# Patient Record
Sex: FEMALE | Race: OTHER | Employment: FULL TIME | URBAN - METROPOLITAN AREA
[De-identification: names, ages, dates, MRNs, and addresses within clinical notes are randomized per-mention and may not be internally consistent; named-entity substitution may affect disease eponyms.]

---

## 2017-03-07 ENCOUNTER — OFFICE VISIT (OUTPATIENT)
Dept: FAMILY MEDICINE CLINIC | Facility: CLINIC | Age: 42
End: 2017-03-07

## 2017-03-07 VITALS
SYSTOLIC BLOOD PRESSURE: 108 MMHG | OXYGEN SATURATION: 99 % | DIASTOLIC BLOOD PRESSURE: 60 MMHG | HEART RATE: 61 BPM | HEIGHT: 62.5 IN | TEMPERATURE: 98 F

## 2017-03-07 DIAGNOSIS — J01.10 ACUTE FRONTAL SINUSITIS, RECURRENCE NOT SPECIFIED: Primary | ICD-10-CM

## 2017-03-07 PROCEDURE — 99213 OFFICE O/P EST LOW 20 MIN: CPT | Performed by: PHYSICIAN ASSISTANT

## 2017-03-07 RX ORDER — AMOXICILLIN AND CLAVULANATE POTASSIUM 875; 125 MG/1; MG/1
1 TABLET, FILM COATED ORAL 2 TIMES DAILY
Qty: 20 TABLET | Refills: 0 | Status: SHIPPED | OUTPATIENT
Start: 2017-03-07 | End: 2017-03-17

## 2017-03-07 NOTE — PROGRESS NOTES
CHIEF COMPLAINT:   Patient presents with:  Sinusitis: x6 days    HPI:   Zulma Everett is a 35year old female who presents for sinus congestion for  6  days. Symptoms have been worsening since onset.  Sinus congestion/pain is described as a pressure and is Comment Esophagoscopy with endoscopic ultrasound    OTHER  12/2012    Comment ESSURE    INJECTION, ANESTHETIC/STEROID, TRANSFORAMINAL EPIDURAL; LUMBAR/SACRAL, SINGLE LEVEL Left 7/17/2014    Comment Procedure: TRANSFORAMINAL EPIDURAL - LUMBAR;  Surgeon: Nickie Cunningham NOSE: nostrils patent with some thick, yellow nasal mucous noted, nasal mucosa reddened and mildly inflamed  THROAT: oral mucosa pink, moist. No visible dental caries. Posterior pharynx is not erythematous. No exudates.   LUNGS: clear to auscultation bilate · Heat may help soothe painful areas of the face. Use a towel soaked in hot water. Or,  the shower and direct the hot spray onto your face.  Using a vaporizer along with a menthol rub at night may also help.   · An expectorant containing guaifenesin · Vision problems, including blurred or double vision  · Fever of 100.4ºF (38ºC) or higher, or as directed by your healthcare provider  · Seizure  · Breathing problems  · Symptoms not resolving within 10 days  Date Last Reviewed: 4/13/2015  © 5816-1563 The Your doctor may prescribe medications to help treat your sinusitis. If you have an infection, antibiotics can help clear it up. If you are prescribed antibiotics, take all pills on schedule until they are gone, even if you feel better.  Decongestants help r · Sit in the nonsmoking sections of restaurants. · Don't go outdoors during peak pollution hours such as rush hour. · Keep an air conditioner on during allergy season. Clean its filter regularly.   · Ask your healthcare provider about a referral to have a

## 2017-03-07 NOTE — PATIENT INSTRUCTIONS
Sinusitis (Antibiotic Treatment)    The sinuses are air-filled spaces within the bones of the face. They connect to the inside of the nose. Sinusitis is an inflammation of the tissue lining the sinus cavity. Sinus inflammation can occur during a cold.  It · Do not use nasal rinses or irrigation during an acute sinus infection, unless told to by your health care provider. Rinsing may spread the infection to other sinuses.   · Use acetaminophen or ibuprofen to control pain, unless another pain medicine was pre Drinking extra fluids helps thin your mucus. This lets it drain from your sinuses more easily. Have a glass of water every hour or two. A humidifier helps in much the same way. Fluids can also offset the drying effects of certain medicines.  If you use a hu When traveling on an airplane, use saline nasal spray to keep your sinuses moist. Drink plenty of fluids. You may also want to take a decongestant before you get on the plane. Prevent colds  Do what you can to avoid being exposed to colds and flu.  When p © 6375-8041 [address], [address] [place]. All rights reserved. This information is not intended as a substitute for professional medical care. Always follow your healthcare professional's instructions.

## 2017-04-24 PROBLEM — M25.561 PATELLOFEMORAL JOINT PAIN, RIGHT: Status: ACTIVE | Noted: 2017-04-24

## 2017-04-24 PROBLEM — M25.461 EFFUSION OF KNEE JOINT RIGHT: Status: ACTIVE | Noted: 2017-04-24

## 2017-05-25 ENCOUNTER — OFFICE VISIT (OUTPATIENT)
Dept: INTERNAL MEDICINE CLINIC | Facility: CLINIC | Age: 42
End: 2017-05-25

## 2017-05-25 VITALS
HEART RATE: 62 BPM | TEMPERATURE: 98 F | DIASTOLIC BLOOD PRESSURE: 62 MMHG | WEIGHT: 139 LBS | HEIGHT: 63 IN | RESPIRATION RATE: 16 BRPM | SYSTOLIC BLOOD PRESSURE: 106 MMHG | BODY MASS INDEX: 24.63 KG/M2

## 2017-05-25 DIAGNOSIS — Z79.899 MEDICATION MANAGEMENT: ICD-10-CM

## 2017-05-25 DIAGNOSIS — R51.9 HEADACHE, UNSPECIFIED HEADACHE TYPE: Primary | ICD-10-CM

## 2017-05-25 PROCEDURE — 99213 OFFICE O/P EST LOW 20 MIN: CPT | Performed by: PHYSICIAN ASSISTANT

## 2017-05-25 PROCEDURE — 93000 ELECTROCARDIOGRAM COMPLETE: CPT | Performed by: PHYSICIAN ASSISTANT

## 2017-05-25 RX ORDER — SUMATRIPTAN 50 MG/1
TABLET, FILM COATED ORAL
Qty: 8 TABLET | Refills: 0 | Status: SHIPPED | OUTPATIENT
Start: 2017-05-25 | End: 2018-03-06

## 2017-05-25 NOTE — PROGRESS NOTES
Patient presents with:  Headache: Pt c/o Headache x 4 days- Pt states she has \"pounding\" sensation in temples, light sensativity and nausea, Pt denies any vomiting,or blurred vision Pt denies any trauma      HPI:  Pt presents with c/o HA x 4 days.   Pt st Laboratory examination ordered as part of a routine general medical examination     UTI (lower urinary tract infection)     URI (upper respiratory infection)     Sinus congestion     Effusion of knee joint right     Patellofemoral joint pain, right No orders of the defined types were placed in this encounter.        Meds & Refills for this Visit:  Signed Prescriptions Disp Refills    SUMAtriptan Succinate 50 MG Oral Tab 8 tablet 0      Sig: Take 1 PO at onset of HA, may repeat X 1 after 2 hr if HA per Pay attention to what seems to trigger your headache. Try to avoid the triggers when you can. If you have frequent headaches, consider keeping a headache diary. In it, write down what you were doing, feeling, or eating in the hours before each headache.  Sh Follow up with your healthcare provider, or as advised. Talk with your provider if you have frequent headaches. He or she can figure out a treatment plan. Ask if you can have medicine to take at home the next time you get a bad headache.  This may keep you

## 2017-05-25 NOTE — PATIENT INSTRUCTIONS
Migraine Headache  This often severe type of headache is different from other types of headaches in that symptoms other than pain occur with the headache.  Nausea and vomiting, lightheadedness, sensitivity to light (photophobia), and other visual disturba If stress seems to be a trigger for your headaches, figure out what is causing stress in your life. Learn new ways to handle your stress. Ideas include regular exercise, biofeedback, self-hypnosis, yoga, and meditation.  Talk with your healthcare provider t Call your healthcare provider right away if any of these occur:  · Your head pain gets worse, or doesn’t get better within 24 hours  · You can’t keep liquids down (repeated vomiting)  · Pain in your sinuses, ears, or throat  · Fever of 100.4º F (38º C) or

## 2017-08-04 ENCOUNTER — OFFICE VISIT (OUTPATIENT)
Dept: FAMILY MEDICINE CLINIC | Facility: CLINIC | Age: 42
End: 2017-08-04

## 2017-08-04 VITALS
OXYGEN SATURATION: 98 % | HEIGHT: 63 IN | HEART RATE: 65 BPM | TEMPERATURE: 99 F | SYSTOLIC BLOOD PRESSURE: 118 MMHG | DIASTOLIC BLOOD PRESSURE: 62 MMHG

## 2017-08-04 DIAGNOSIS — J01.01 ACUTE RECURRENT MAXILLARY SINUSITIS: Primary | ICD-10-CM

## 2017-08-04 PROCEDURE — 99213 OFFICE O/P EST LOW 20 MIN: CPT | Performed by: PHYSICIAN ASSISTANT

## 2017-08-04 RX ORDER — AMOXICILLIN AND CLAVULANATE POTASSIUM 875; 125 MG/1; MG/1
1 TABLET, FILM COATED ORAL 2 TIMES DAILY
Qty: 20 TABLET | Refills: 0 | Status: SHIPPED | OUTPATIENT
Start: 2017-08-04 | End: 2017-08-14

## 2017-08-04 NOTE — PATIENT INSTRUCTIONS
Self-Care for Sinusitis     Drinking plenty of water can help sinuses drain. Sinusitis can often be managed with self-care. Self-care can keep sinuses moist and make you feel more comfortable. Remember to follow your doctor's instructions closely.  This Colds, flu, and allergies make it more likely for you to get sinusitis. Do your best to prevent sinusitis by preventing these problems. Do what you can to avoid getting colds and other infections. Stay away from things that cause allergies (allergens).  Katelyn Spar · Stay away from all types of smoke, which dries out sinus linings. This includes tobacco smoke and chemical smoke in workplace settings. · Use saltwater rinses.   Date Last Reviewed: 10/1/2016  © 7461-2612 The [address] · Over-the-counter decongestants may be used unless a similar medicine was prescribed. Nasal sprays work the fastest. Use one that contains phenylephrine or oxymetazoline. First blow the nose gently. Then use the spray.  Do not use these medicines more ofte © 8747-2022 [address], [address] [place]. All rights reserved. This information is not intended as a substitute for professional medical care. Always follow your healthcare professional's instructions.

## 2017-08-04 NOTE — PROGRESS NOTES
CHIEF COMPLAINT:   Patient presents with:  Sinusitis    HPI:   Addis Miller is a 38year old female who presents for sinus congestion for  5  days. Symptoms have been worsening since onset.  Sinus congestion/pain is described as a pressure and is located No date: CHOLECYSTECTOMY      Comment: June 2006 5/14/2010: COLONOSCOPY      Comment: Complete, d/t hematochezia  1992: D & C      Comment: also in 1994, 1996, 1997 7/17/2014: INJECTION, ANESTHETIC/STEROID, TRANSFORAMINAL * Left      Comment: Procedure: /62 (BP Location: Right arm, Patient Position: Sitting, Cuff Size: adult)   Pulse 65   Temp 98.6 °F (37 °C) (Oral)   Ht 63\"   LMP 08/01/2017 (Exact Date)   SpO2 98%   Breastfeeding?  No   GENERAL: well developed, well nourished, and in no apparent di Risks, benefits, side effects of medication addressed and explained. Patient Instructions       Self-Care for Sinusitis     Drinking plenty of water can help sinuses drain. Sinusitis can often be managed with self-care.  Self-care can keep sinuses mois © 1297-2017 The [address] [place] [address], [address] [place]. All rights reserved. This information is not intended as a substitute for professional medical care. Always follow your healthcare professional's instructions.         Prevent Keeping your sinuses moist makes your mucus thinner. This allows your sinuses to drain better. And this helps prevent infection. Ask your doctor about these suggestions:  · Use a humidifier. Clean it often to remove any mold or mildew.   · Drink several gla · Over-the-counter decongestants may be used unless a similar medicine was prescribed. Nasal sprays work the fastest. Use one that contains phenylephrine or oxymetazoline. First blow the nose gently. Then use the spray.  Do not use these medicines more ofte © 4246-8298 The [address] [place] [address], [address] [place]. All rights reserved. This information is not intended as a substitute for professional medical care. Always follow your healthcare professional's instructions.             The

## 2017-12-18 ENCOUNTER — OFFICE VISIT (OUTPATIENT)
Dept: INTERNAL MEDICINE CLINIC | Facility: CLINIC | Age: 42
End: 2017-12-18

## 2017-12-18 VITALS
SYSTOLIC BLOOD PRESSURE: 100 MMHG | BODY MASS INDEX: 26.02 KG/M2 | DIASTOLIC BLOOD PRESSURE: 60 MMHG | WEIGHT: 145 LBS | HEIGHT: 62.5 IN | TEMPERATURE: 98 F | HEART RATE: 77 BPM | RESPIRATION RATE: 16 BRPM | OXYGEN SATURATION: 99 %

## 2017-12-18 DIAGNOSIS — Z13.29 SCREENING FOR THYROID DISORDER: ICD-10-CM

## 2017-12-18 DIAGNOSIS — Z13.228 SCREENING FOR ENDOCRINE, NUTRITIONAL, METABOLIC AND IMMUNITY DISORDER: ICD-10-CM

## 2017-12-18 DIAGNOSIS — Z13.0 SCREENING FOR ENDOCRINE, NUTRITIONAL, METABOLIC AND IMMUNITY DISORDER: ICD-10-CM

## 2017-12-18 DIAGNOSIS — Z13.0 SCREENING FOR DISORDER OF BLOOD AND BLOOD-FORMING ORGANS: ICD-10-CM

## 2017-12-18 DIAGNOSIS — Z13.29 SCREENING FOR ENDOCRINE, NUTRITIONAL, METABOLIC AND IMMUNITY DISORDER: ICD-10-CM

## 2017-12-18 DIAGNOSIS — Z13.21 SCREENING FOR ENDOCRINE, NUTRITIONAL, METABOLIC AND IMMUNITY DISORDER: ICD-10-CM

## 2017-12-18 DIAGNOSIS — M25.511 ACUTE PAIN OF RIGHT SHOULDER: ICD-10-CM

## 2017-12-18 DIAGNOSIS — Z00.00 ROUTINE GENERAL MEDICAL EXAMINATION AT A HEALTH CARE FACILITY: Primary | ICD-10-CM

## 2017-12-18 DIAGNOSIS — Z13.220 SCREENING FOR LIPID DISORDERS: ICD-10-CM

## 2017-12-18 PROCEDURE — 99396 PREV VISIT EST AGE 40-64: CPT | Performed by: INTERNAL MEDICINE

## 2017-12-18 NOTE — PROGRESS NOTES
Patient presents with:  Physical: no pap      HPI:    Patient here for cpe  Sees Dr. Juan Mccormack, believes she is utd on pap and mammogram, will have information faxed to us  Just started her period today,stays regular with OCP  No complaints except right should positive 12/2012   • Cholelithiasis    • Chronic sinusitis    • Eczema    • General counseling for initiation of other contraceptive measures    • Herniated lumbar disc without myelopathy     L4-L5   • History of molar pregnancy, antepartum     x 2   • Int Current Outpatient Prescriptions:  SUMAtriptan Succinate 50 MG Oral Tab Take 1 PO at onset of HA, may repeat X 1 after 2 hr if HA persists. Disp: 8 tablet Rfl: 0   NORTREL 7/7/7 0.5/0.75/1-35 MG-MCG Oral Tab TAKE 1 TABLET BY MOUTH DAILY.  Disp: 3 Pa sensory deficit. Normal muscle tone. Coordination normal.   Skin: Skin is warm and dry. No rash noted. No erythema. No pallor. Psychiatric: Normal mood and affect.      A/P:    Routine general medical examination at a health care facility - sees gyne for

## 2017-12-22 ENCOUNTER — LAB ENCOUNTER (OUTPATIENT)
Dept: LAB | Age: 42
End: 2017-12-22
Attending: INTERNAL MEDICINE
Payer: COMMERCIAL

## 2017-12-22 DIAGNOSIS — Z13.228 SCREENING FOR ENDOCRINE, NUTRITIONAL, METABOLIC AND IMMUNITY DISORDER: ICD-10-CM

## 2017-12-22 DIAGNOSIS — Z13.0 SCREENING FOR DISORDER OF BLOOD AND BLOOD-FORMING ORGANS: ICD-10-CM

## 2017-12-22 DIAGNOSIS — Z13.29 SCREENING FOR ENDOCRINE, NUTRITIONAL, METABOLIC AND IMMUNITY DISORDER: ICD-10-CM

## 2017-12-22 DIAGNOSIS — Z13.0 SCREENING FOR ENDOCRINE, NUTRITIONAL, METABOLIC AND IMMUNITY DISORDER: ICD-10-CM

## 2017-12-22 DIAGNOSIS — Z13.220 SCREENING FOR LIPID DISORDERS: ICD-10-CM

## 2017-12-22 DIAGNOSIS — Z13.29 SCREENING FOR THYROID DISORDER: ICD-10-CM

## 2017-12-22 DIAGNOSIS — Z13.21 SCREENING FOR ENDOCRINE, NUTRITIONAL, METABOLIC AND IMMUNITY DISORDER: ICD-10-CM

## 2017-12-22 PROCEDURE — 80061 LIPID PANEL: CPT | Performed by: INTERNAL MEDICINE

## 2017-12-22 PROCEDURE — 80050 GENERAL HEALTH PANEL: CPT | Performed by: INTERNAL MEDICINE

## 2018-01-12 ENCOUNTER — OFFICE VISIT (OUTPATIENT)
Dept: INTERNAL MEDICINE CLINIC | Facility: CLINIC | Age: 43
End: 2018-01-12

## 2018-01-12 VITALS
HEIGHT: 63 IN | TEMPERATURE: 99 F | WEIGHT: 139 LBS | HEART RATE: 92 BPM | DIASTOLIC BLOOD PRESSURE: 60 MMHG | SYSTOLIC BLOOD PRESSURE: 92 MMHG | BODY MASS INDEX: 24.63 KG/M2

## 2018-01-12 DIAGNOSIS — R11.2 NON-INTRACTABLE VOMITING WITH NAUSEA, UNSPECIFIED VOMITING TYPE: ICD-10-CM

## 2018-01-12 DIAGNOSIS — R19.7 DIARRHEA, UNSPECIFIED TYPE: Primary | ICD-10-CM

## 2018-01-12 PROCEDURE — 99213 OFFICE O/P EST LOW 20 MIN: CPT | Performed by: NURSE PRACTITIONER

## 2018-01-12 RX ORDER — ONDANSETRON 4 MG/1
4 TABLET, FILM COATED ORAL EVERY 12 HOURS PRN
Qty: 20 TABLET | Refills: 0 | Status: SHIPPED | OUTPATIENT
Start: 2018-01-12 | End: 2018-03-06

## 2018-01-12 NOTE — PROGRESS NOTES
Cliff Bueno is a 38year old female. Patient presents with:  Diarrhea: and terrible vomiting since 1 am.      HPI:   Presents for eval of abd pain. Last night with sudden onset of foul odor flatus.   Later dizziness and lightheaded  Concerned for verti mention of complication     Post Varicella      Social History:  Smoking status: Never Smoker                                                              Smokeless tobacco: Never Used                      Alcohol use:  No              Family History   Prob Consults:  None    No Follow-up on file. There are no Patient Instructions on file for this visit.

## 2018-03-06 ENCOUNTER — TELEPHONE (OUTPATIENT)
Dept: INTERNAL MEDICINE CLINIC | Facility: CLINIC | Age: 43
End: 2018-03-06

## 2018-03-06 PROBLEM — M25.561 ACUTE PAIN OF RIGHT KNEE: Status: ACTIVE | Noted: 2018-03-06

## 2018-03-06 NOTE — TELEPHONE ENCOUNTER
Patient dropped off [employer] Self-Reporting Form to be completed by Dr. Carol Ann Cristina. Once complete, fax to:    [facility]  Fax: [phone]    Questions: [phone] original in incoming faxes folder at the .   Isabella Ghotra

## 2018-03-12 NOTE — TELEPHONE ENCOUNTER
Forms have been signed, faxed and sent to scan. Confirmation was received.  Original along with confirmation was mailed to patient

## 2018-03-19 PROBLEM — M23.91 INTERNAL DERANGEMENT OF KNEE, RIGHT: Status: ACTIVE | Noted: 2018-03-19

## 2018-04-13 ENCOUNTER — OFFICE VISIT (OUTPATIENT)
Dept: INTERNAL MEDICINE CLINIC | Facility: CLINIC | Age: 43
End: 2018-04-13

## 2018-04-13 ENCOUNTER — MED REC SCAN ONLY (OUTPATIENT)
Dept: INTERNAL MEDICINE CLINIC | Facility: CLINIC | Age: 43
End: 2018-04-13

## 2018-04-13 ENCOUNTER — LAB ENCOUNTER (OUTPATIENT)
Dept: LAB | Age: 43
End: 2018-04-13
Attending: NURSE PRACTITIONER
Payer: COMMERCIAL

## 2018-04-13 VITALS
TEMPERATURE: 98 F | WEIGHT: 135 LBS | HEART RATE: 64 BPM | HEIGHT: 63 IN | BODY MASS INDEX: 23.92 KG/M2 | RESPIRATION RATE: 16 BRPM | DIASTOLIC BLOOD PRESSURE: 60 MMHG | SYSTOLIC BLOOD PRESSURE: 102 MMHG

## 2018-04-13 DIAGNOSIS — Z20.2 POSSIBLE EXPOSURE TO STD: Primary | ICD-10-CM

## 2018-04-13 DIAGNOSIS — Z20.2 POSSIBLE EXPOSURE TO STD: ICD-10-CM

## 2018-04-13 PROBLEM — M25.561 ACUTE PAIN OF RIGHT KNEE: Status: RESOLVED | Noted: 2018-03-06 | Resolved: 2018-04-13

## 2018-04-13 PROCEDURE — 87591 N.GONORRHOEAE DNA AMP PROB: CPT | Performed by: NURSE PRACTITIONER

## 2018-04-13 PROCEDURE — 87340 HEPATITIS B SURFACE AG IA: CPT | Performed by: NURSE PRACTITIONER

## 2018-04-13 PROCEDURE — 86706 HEP B SURFACE ANTIBODY: CPT | Performed by: NURSE PRACTITIONER

## 2018-04-13 PROCEDURE — 87510 GARDNER VAG DNA DIR PROBE: CPT | Performed by: NURSE PRACTITIONER

## 2018-04-13 PROCEDURE — 87389 HIV-1 AG W/HIV-1&-2 AB AG IA: CPT | Performed by: NURSE PRACTITIONER

## 2018-04-13 PROCEDURE — 87660 TRICHOMONAS VAGIN DIR PROBE: CPT | Performed by: NURSE PRACTITIONER

## 2018-04-13 PROCEDURE — 87491 CHLMYD TRACH DNA AMP PROBE: CPT | Performed by: NURSE PRACTITIONER

## 2018-04-13 PROCEDURE — 99213 OFFICE O/P EST LOW 20 MIN: CPT | Performed by: NURSE PRACTITIONER

## 2018-04-13 PROCEDURE — 86803 HEPATITIS C AB TEST: CPT | Performed by: NURSE PRACTITIONER

## 2018-04-13 PROCEDURE — 87480 CANDIDA DNA DIR PROBE: CPT | Performed by: NURSE PRACTITIONER

## 2018-04-13 RX ORDER — METRONIDAZOLE 500 MG/1
500 TABLET ORAL 2 TIMES DAILY
Qty: 14 TABLET | Refills: 0 | Status: SHIPPED | OUTPATIENT
Start: 2018-04-13 | End: 2018-09-12 | Stop reason: ALTCHOICE

## 2018-04-13 NOTE — PROGRESS NOTES
Abran Contreras is a 38year old female. Patient presents with:  STD: Room 10 AH - Patient requesting testing due to possible recent exposure       HPI:   Presents for STD testing. She recently became aware of her  having an affair.    She states s Breast Cancer Paternal Aunt 52   • Asthma Daughter         Allergies  No Known Allergies    REVIEW OF SYSTEMS:   GENERAL HEALTH: feels well otherwise  GI: denies abdominal pain and denies heartburn, no diarrhea or constipation  MUSCULOSKELETAL:  No arthral

## 2018-04-16 DIAGNOSIS — Z23 NEED FOR HEPATITIS B VACCINATION: Primary | ICD-10-CM

## 2018-04-17 ENCOUNTER — NURSE ONLY (OUTPATIENT)
Dept: INTERNAL MEDICINE CLINIC | Facility: CLINIC | Age: 43
End: 2018-04-17

## 2018-04-17 PROCEDURE — 90471 IMMUNIZATION ADMIN: CPT | Performed by: INTERNAL MEDICINE

## 2018-04-17 PROCEDURE — 90746 HEPB VACCINE 3 DOSE ADULT IM: CPT | Performed by: INTERNAL MEDICINE

## 2018-04-17 NOTE — PROGRESS NOTES
Patient here for Hepatitis B vaccine #1, given in the left deltoid without difficulty. Patient tolerated well.

## 2018-09-12 ENCOUNTER — OFFICE VISIT (OUTPATIENT)
Dept: FAMILY MEDICINE CLINIC | Facility: CLINIC | Age: 43
End: 2018-09-12
Payer: COMMERCIAL

## 2018-09-12 VITALS
SYSTOLIC BLOOD PRESSURE: 108 MMHG | RESPIRATION RATE: 16 BRPM | TEMPERATURE: 98 F | DIASTOLIC BLOOD PRESSURE: 60 MMHG | HEART RATE: 65 BPM | OXYGEN SATURATION: 99 %

## 2018-09-12 DIAGNOSIS — J06.9 UPPER RESPIRATORY TRACT INFECTION, UNSPECIFIED TYPE: Primary | ICD-10-CM

## 2018-09-12 PROCEDURE — 99213 OFFICE O/P EST LOW 20 MIN: CPT | Performed by: PHYSICIAN ASSISTANT

## 2018-09-12 RX ORDER — BENZONATATE 200 MG/1
200 CAPSULE ORAL 3 TIMES DAILY PRN
Qty: 30 CAPSULE | Refills: 0 | Status: SHIPPED | OUTPATIENT
Start: 2018-09-12 | End: 2019-04-02 | Stop reason: ALTCHOICE

## 2018-09-12 RX ORDER — FLUTICASONE PROPIONATE 50 MCG
2 SPRAY, SUSPENSION (ML) NASAL DAILY
Qty: 1 INHALER | Refills: 0 | Status: SHIPPED | OUTPATIENT
Start: 2018-09-12 | End: 2019-04-02 | Stop reason: ALTCHOICE

## 2018-09-12 RX ORDER — AMOXICILLIN AND CLAVULANATE POTASSIUM 875; 125 MG/1; MG/1
1 TABLET, FILM COATED ORAL 2 TIMES DAILY
Qty: 20 TABLET | Refills: 0 | Status: SHIPPED | OUTPATIENT
Start: 2018-09-12 | End: 2018-09-22

## 2018-09-13 NOTE — PROGRESS NOTES
CHIEF COMPLAINT:   Patient presents with:  URI: 8 days. congestion, nasal drip, cough, feverish feeling, achy, sore throat. HPI:      Petey Bipin is a 37year old female who presents for upper respiratory symptoms for 8 days.  Patient reports sore Esophagoscopy with endoscopic ultrasound  1992: PUNC/ASPIR BREAST CYST      Comment:  left breast cyst      Social History    Tobacco Use      Smoking status: Never Smoker      Smokeless tobacco: Never Used    Alcohol use: No      Alcohol/week: 0.0 oz    D (200 mg total) by mouth 3 (three) times daily as needed for cough. • Amoxicillin-Pot Clavulanate 875-125 MG Oral Tab 20 tablet 0     Sig: Take 1 tablet by mouth 2 (two) times daily for 10 days. PLAN: Meds as above. Recommend rest and fluids.   Ty

## 2019-04-02 ENCOUNTER — OFFICE VISIT (OUTPATIENT)
Dept: FAMILY MEDICINE CLINIC | Facility: CLINIC | Age: 44
End: 2019-04-02
Payer: COMMERCIAL

## 2019-04-02 VITALS
DIASTOLIC BLOOD PRESSURE: 60 MMHG | TEMPERATURE: 98 F | HEART RATE: 98 BPM | OXYGEN SATURATION: 99 % | SYSTOLIC BLOOD PRESSURE: 96 MMHG | RESPIRATION RATE: 16 BRPM

## 2019-04-02 DIAGNOSIS — J01.00 ACUTE NON-RECURRENT MAXILLARY SINUSITIS: Primary | ICD-10-CM

## 2019-04-02 PROCEDURE — 99213 OFFICE O/P EST LOW 20 MIN: CPT | Performed by: PHYSICIAN ASSISTANT

## 2019-04-02 RX ORDER — AMOXICILLIN AND CLAVULANATE POTASSIUM 875; 125 MG/1; MG/1
1 TABLET, FILM COATED ORAL 2 TIMES DAILY
Qty: 20 TABLET | Refills: 0 | Status: SHIPPED | OUTPATIENT
Start: 2019-04-02 | End: 2019-04-12

## 2019-04-02 RX ORDER — ALBUTEROL SULFATE 90 UG/1
2 AEROSOL, METERED RESPIRATORY (INHALATION) EVERY 4 HOURS PRN
Qty: 1 INHALER | Refills: 0 | Status: SHIPPED | OUTPATIENT
Start: 2019-04-02 | End: 2019-05-10

## 2019-04-02 RX ORDER — BENZONATATE 200 MG/1
200 CAPSULE ORAL 3 TIMES DAILY PRN
Qty: 30 CAPSULE | Refills: 0 | Status: SHIPPED | OUTPATIENT
Start: 2019-04-02 | End: 2019-05-10

## 2019-04-02 RX ORDER — FLUTICASONE PROPIONATE 50 MCG
2 SPRAY, SUSPENSION (ML) NASAL DAILY
Qty: 1 INHALER | Refills: 0 | Status: SHIPPED | OUTPATIENT
Start: 2019-04-02 | End: 2019-05-10

## 2019-04-02 NOTE — PROGRESS NOTES
CHIEF COMPLAINT:   Patient presents with:  Sinus Problem      HPI:   Mg Setting is a 37year old female who presents for cold symptoms for  10  days. Symptoms have progressed into sinus congestion and been worsening since onset.  Sinus congestion/pain i • Internal derangement of knee, right 3/19/2018   • Internal hemorrhoids    • Varicella without mention of complication     Post Varicella      Past Surgical History:   Procedure Laterality Date   • BACK SURGERY  L4/5    December 2007: [facility], pharynx is mildy erythematous, small PND,  no exudates  Tonsils enlarged +2-3  NECK: supple, non-tender  LUNGS: clear to auscultation bilaterally, no wheezes or rhonchi. Breathing is non labored.   CARDIO: S1S2 RRR   EXTREMITIES: no cyanosis, clubbing or ed

## 2019-05-10 PROCEDURE — 87624 HPV HI-RISK TYP POOLED RSLT: CPT | Performed by: OBSTETRICS & GYNECOLOGY

## 2019-05-10 PROCEDURE — 88175 CYTOPATH C/V AUTO FLUID REDO: CPT | Performed by: OBSTETRICS & GYNECOLOGY

## 2019-07-13 ENCOUNTER — HOSPITAL ENCOUNTER (OUTPATIENT)
Age: 44
Discharge: HOME | End: 2019-07-13
Attending: FAMILY MEDICINE
Payer: COMMERCIAL

## 2019-07-13 VITALS
HEIGHT: 63 IN | OXYGEN SATURATION: 99 % | DIASTOLIC BLOOD PRESSURE: 71 MMHG | BODY MASS INDEX: 23.04 KG/M2 | TEMPERATURE: 98 F | HEART RATE: 61 BPM | RESPIRATION RATE: 16 BRPM | WEIGHT: 130 LBS | SYSTOLIC BLOOD PRESSURE: 94 MMHG

## 2019-07-13 DIAGNOSIS — G43.909 MIGRAINE WITHOUT STATUS MIGRAINOSUS, NOT INTRACTABLE, UNSPECIFIED MIGRAINE TYPE: Primary | ICD-10-CM

## 2019-07-13 DIAGNOSIS — H65.91 RIGHT NON-SUPPURATIVE OTITIS MEDIA: ICD-10-CM

## 2019-07-13 DIAGNOSIS — H61.23 BILATERAL IMPACTED CERUMEN: ICD-10-CM

## 2019-07-13 PROCEDURE — 99204 OFFICE O/P NEW MOD 45 MIN: CPT

## 2019-07-13 PROCEDURE — 99214 OFFICE O/P EST MOD 30 MIN: CPT

## 2019-07-13 PROCEDURE — 96374 THER/PROPH/DIAG INJ IV PUSH: CPT

## 2019-07-13 PROCEDURE — 96375 TX/PRO/DX INJ NEW DRUG ADDON: CPT

## 2019-07-13 PROCEDURE — 69210 REMOVE IMPACTED EAR WAX UNI: CPT

## 2019-07-13 RX ORDER — SUMATRIPTAN 50 MG/1
50 TABLET, FILM COATED ORAL EVERY 2 HOUR PRN
Qty: 6 TABLET | Refills: 0 | Status: SHIPPED | OUTPATIENT
Start: 2019-07-13 | End: 2019-12-30

## 2019-07-13 RX ORDER — AMOXICILLIN 875 MG/1
875 TABLET, COATED ORAL EVERY 12 HOURS
Qty: 20 TABLET | Refills: 0 | Status: SHIPPED | OUTPATIENT
Start: 2019-07-13 | End: 2019-07-23

## 2019-07-13 RX ORDER — KETOROLAC TROMETHAMINE 30 MG/ML
30 INJECTION, SOLUTION INTRAMUSCULAR; INTRAVENOUS ONCE
Status: COMPLETED | OUTPATIENT
Start: 2019-07-13 | End: 2019-07-13

## 2019-07-13 RX ORDER — DIPHENHYDRAMINE HYDROCHLORIDE 50 MG/ML
25 INJECTION INTRAMUSCULAR; INTRAVENOUS ONCE
Status: COMPLETED | OUTPATIENT
Start: 2019-07-13 | End: 2019-07-13

## 2019-07-13 RX ORDER — METOCLOPRAMIDE HYDROCHLORIDE 5 MG/ML
10 INJECTION INTRAMUSCULAR; INTRAVENOUS ONCE
Status: COMPLETED | OUTPATIENT
Start: 2019-07-13 | End: 2019-07-13

## 2019-07-13 NOTE — ED INITIAL ASSESSMENT (HPI)
Hx of migraines, says typical pain x 4 days, temporal throbbing & pain to nape of neck but also having ear pain & sinus pressure. Recent travel by plane. No relief w OTC Excedrine migraine. Waves of nausea.

## 2019-07-14 NOTE — ED PROVIDER NOTES
Patient Seen in: [facility]    History   Patient presents with:  Headache (neurologic)    Stated Complaint: migrane x4 days     HPI    53-year-old female with history of migraine presents for headache for the past 4 days. Never Used    Alcohol use: No      Alcohol/week: 0.0 oz    Drug use: No      Review of Systems    Positive for stated complaint: migrane x4 days   Other systems are as noted in HPI. Constitutional and vital signs reviewed.       All other systems reviewed headache. All with the PCP if not better.             Disposition and Plan     Clinical Impression:  Migraine without status migrainosus, not intractable, unspecified migraine type  (primary encounter diagnosis)  Right non-suppurative otitis media  Bilater

## 2019-08-19 ENCOUNTER — OFFICE VISIT (OUTPATIENT)
Dept: INTERNAL MEDICINE CLINIC | Facility: CLINIC | Age: 44
End: 2019-08-19
Payer: COMMERCIAL

## 2019-08-19 VITALS
DIASTOLIC BLOOD PRESSURE: 50 MMHG | SYSTOLIC BLOOD PRESSURE: 94 MMHG | BODY MASS INDEX: 24.8 KG/M2 | WEIGHT: 140 LBS | HEIGHT: 63 IN | RESPIRATION RATE: 16 BRPM | TEMPERATURE: 99 F | HEART RATE: 84 BPM

## 2019-08-19 DIAGNOSIS — Z82.49 FAMILY HISTORY OF ANEURYSM: ICD-10-CM

## 2019-08-19 DIAGNOSIS — R51.9 WORSENING HEADACHES: Primary | ICD-10-CM

## 2019-08-19 PROCEDURE — 99214 OFFICE O/P EST MOD 30 MIN: CPT | Performed by: NURSE PRACTITIONER

## 2019-08-19 RX ORDER — ONDANSETRON 4 MG/1
4 TABLET, FILM COATED ORAL EVERY 12 HOURS PRN
Qty: 20 TABLET | Refills: 0 | Status: SHIPPED | OUTPATIENT
Start: 2019-08-19 | End: 2019-11-22 | Stop reason: ALTCHOICE

## 2019-08-19 RX ORDER — FLUTICASONE PROPIONATE 50 MCG
1 SPRAY, SUSPENSION (ML) NASAL 2 TIMES DAILY
Qty: 1 BOTTLE | Refills: 3 | Status: SHIPPED | OUTPATIENT
Start: 2019-08-19

## 2019-08-19 RX ORDER — NAPROXEN 500 MG/1
500 TABLET ORAL 2 TIMES DAILY WITH MEALS
Qty: 60 TABLET | Refills: 0 | Status: SHIPPED | OUTPATIENT
Start: 2019-08-19 | End: 2019-09-13

## 2019-08-19 NOTE — PROGRESS NOTES
Norbert Fernandes is a 53year old female. Patient presents with:  Migraine: x2 months       HPI:   Presents for eval of persistent headaches. Seen at [facility] 7/13/19 for 4 day history headache. Typical of her migraine.     She has had migraines in the past but History of molar pregnancy, antepartum     x 2   • Internal derangement of knee, right 3/19/2018   • Internal hemorrhoids    • Varicella without mention of complication     Post Varicella      Social History:  Social History    Tobacco Use      Smoking sta • naproxen 500 MG Oral Tab 60 tablet 0     Sig: Take 1 tablet (500 mg total) by mouth 2 (two) times daily with meals. • Fluticasone Propionate 50 MCG/ACT Nasal Suspension 1 Bottle 3     Si spray by Nasal route 2 (two) times daily.    • Ondansetron H

## 2019-08-25 ENCOUNTER — HOSPITAL ENCOUNTER (OUTPATIENT)
Dept: MRI IMAGING | Age: 44
Discharge: HOME | End: 2019-08-25
Attending: NURSE PRACTITIONER
Payer: COMMERCIAL

## 2019-08-25 DIAGNOSIS — Z82.49 FAMILY HISTORY OF ANEURYSM: ICD-10-CM

## 2019-08-25 DIAGNOSIS — R51.9 WORSENING HEADACHES: ICD-10-CM

## 2019-08-25 PROCEDURE — A9575 INJ GADOTERATE MEGLUMI 0.1ML: HCPCS | Performed by: NURSE PRACTITIONER

## 2019-08-25 PROCEDURE — 70553 MRI BRAIN STEM W/O & W/DYE: CPT | Performed by: NURSE PRACTITIONER

## 2019-09-13 RX ORDER — NAPROXEN 500 MG/1
TABLET ORAL
Qty: 60 TABLET | Refills: 0 | Status: SHIPPED | OUTPATIENT
Start: 2019-09-13 | End: 2019-11-22 | Stop reason: ALTCHOICE

## 2019-11-22 ENCOUNTER — OFFICE VISIT (OUTPATIENT)
Dept: FAMILY MEDICINE CLINIC | Facility: CLINIC | Age: 44
End: 2019-11-22
Payer: COMMERCIAL

## 2019-11-22 VITALS
BODY MASS INDEX: 24 KG/M2 | RESPIRATION RATE: 16 BRPM | WEIGHT: 138 LBS | HEART RATE: 74 BPM | SYSTOLIC BLOOD PRESSURE: 100 MMHG | DIASTOLIC BLOOD PRESSURE: 60 MMHG | OXYGEN SATURATION: 99 % | TEMPERATURE: 98 F

## 2019-11-22 DIAGNOSIS — J06.9 UPPER RESPIRATORY TRACT INFECTION, UNSPECIFIED TYPE: Primary | ICD-10-CM

## 2019-11-22 PROCEDURE — 99213 OFFICE O/P EST LOW 20 MIN: CPT | Performed by: PHYSICIAN ASSISTANT

## 2019-11-22 RX ORDER — AMOXICILLIN AND CLAVULANATE POTASSIUM 875; 125 MG/1; MG/1
1 TABLET, FILM COATED ORAL 2 TIMES DAILY
Qty: 20 TABLET | Refills: 0 | Status: SHIPPED | OUTPATIENT
Start: 2019-11-22 | End: 2019-12-02

## 2019-11-22 RX ORDER — BENZONATATE 200 MG/1
200 CAPSULE ORAL 3 TIMES DAILY PRN
Qty: 30 CAPSULE | Refills: 0 | Status: SHIPPED | OUTPATIENT
Start: 2019-11-22 | End: 2020-02-15

## 2019-12-30 ENCOUNTER — TELEPHONE (OUTPATIENT)
Dept: INTERNAL MEDICINE CLINIC | Facility: CLINIC | Age: 44
End: 2019-12-30

## 2019-12-30 DIAGNOSIS — Z00.00 ROUTINE GENERAL MEDICAL EXAMINATION AT A HEALTH CARE FACILITY: Primary | ICD-10-CM

## 2019-12-30 DIAGNOSIS — Z13.220 SCREENING FOR LIPID DISORDERS: ICD-10-CM

## 2019-12-30 DIAGNOSIS — Z13.0 SCREENING FOR BLOOD DISEASE: ICD-10-CM

## 2019-12-30 DIAGNOSIS — Z13.228 SCREENING FOR METABOLIC DISORDER: ICD-10-CM

## 2019-12-30 DIAGNOSIS — Z13.29 SCREENING FOR THYROID DISORDER: ICD-10-CM

## 2019-12-30 RX ORDER — SUMATRIPTAN 50 MG/1
50 TABLET, FILM COATED ORAL EVERY 2 HOUR PRN
Qty: 6 TABLET | Refills: 0 | Status: SHIPPED | OUTPATIENT
Start: 2019-12-30 | End: 2020-01-29

## 2019-12-30 NOTE — TELEPHONE ENCOUNTER
Pt called stating she got a rx for SUMAtriptan Succinate 50 MG Oral Tab when she went to  in July and needs new rx sent in -send to local [facility]

## 2019-12-30 NOTE — TELEPHONE ENCOUNTER
Last VISIT 08/19/19  Last REFILL 07/13/19 qty 6 w/0 refills  Last LABS No labs in past year    No future appointments. Please Approve or Deny.

## 2020-01-02 NOTE — TELEPHONE ENCOUNTER
Future Appointments   Date Time Provider Jayshree Caitlin   2/15/2020 11:40 AM Lc Canales MD [facility]     Orders to THE [facility] aware must fast no call back required

## 2020-02-08 ENCOUNTER — LAB ENCOUNTER (OUTPATIENT)
Dept: LAB | Age: 45
End: 2020-02-08
Attending: NURSE PRACTITIONER
Payer: COMMERCIAL

## 2020-02-08 DIAGNOSIS — Z00.00 ROUTINE GENERAL MEDICAL EXAMINATION AT A HEALTH CARE FACILITY: ICD-10-CM

## 2020-02-08 DIAGNOSIS — Z13.0 SCREENING FOR BLOOD DISEASE: ICD-10-CM

## 2020-02-08 DIAGNOSIS — Z13.29 SCREENING FOR THYROID DISORDER: ICD-10-CM

## 2020-02-08 DIAGNOSIS — Z13.228 SCREENING FOR METABOLIC DISORDER: ICD-10-CM

## 2020-02-08 DIAGNOSIS — Z13.220 SCREENING FOR LIPID DISORDERS: ICD-10-CM

## 2020-02-08 LAB
ALBUMIN SERPL-MCNC: 3.8 G/DL (ref 3.4–5)
ALBUMIN/GLOB SERPL: 0.8 {RATIO} (ref 1–2)
ALP LIVER SERPL-CCNC: 61 U/L (ref 39–100)
ALT SERPL-CCNC: 21 U/L (ref 13–56)
ANION GAP SERPL CALC-SCNC: 3 MMOL/L (ref 0–18)
AST SERPL-CCNC: 16 U/L (ref 15–37)
BASOPHILS # BLD AUTO: 0.06 X10(3) UL (ref 0–0.2)
BASOPHILS NFR BLD AUTO: 0.7 %
BILIRUB SERPL-MCNC: 0.4 MG/DL (ref 0.1–2)
BUN BLD-MCNC: 11 MG/DL (ref 7–18)
BUN/CREAT SERPL: 12.2 (ref 10–20)
CALCIUM BLD-MCNC: 9.8 MG/DL (ref 8.5–10.1)
CHLORIDE SERPL-SCNC: 107 MMOL/L (ref 98–112)
CHOLEST SMN-MCNC: 203 MG/DL (ref ?–200)
CO2 SERPL-SCNC: 28 MMOL/L (ref 21–32)
CREAT BLD-MCNC: 0.9 MG/DL (ref 0.55–1.02)
DEPRECATED RDW RBC AUTO: 46.5 FL (ref 35.1–46.3)
EOSINOPHIL # BLD AUTO: 0.13 X10(3) UL (ref 0–0.7)
EOSINOPHIL NFR BLD AUTO: 1.5 %
ERYTHROCYTE [DISTWIDTH] IN BLOOD BY AUTOMATED COUNT: 13.5 % (ref 11–15)
GLOBULIN PLAS-MCNC: 4.6 G/DL (ref 2.8–4.4)
GLUCOSE BLD-MCNC: 93 MG/DL (ref 70–99)
HCT VFR BLD AUTO: 43.9 % (ref 35–48)
HDLC SERPL-MCNC: 64 MG/DL (ref 40–59)
HGB BLD-MCNC: 13.7 G/DL (ref 12–16)
IMM GRANULOCYTES # BLD AUTO: 0.03 X10(3) UL (ref 0–1)
IMM GRANULOCYTES NFR BLD: 0.4 %
LDLC SERPL CALC-MCNC: 115 MG/DL (ref ?–100)
LYMPHOCYTES # BLD AUTO: 2.12 X10(3) UL (ref 1–4)
LYMPHOCYTES NFR BLD AUTO: 24.7 %
M PROTEIN MFR SERPL ELPH: 8.4 G/DL (ref 6.4–8.2)
MCH RBC QN AUTO: 29.3 PG (ref 26–34)
MCHC RBC AUTO-ENTMCNC: 31.2 G/DL (ref 31–37)
MCV RBC AUTO: 94 FL (ref 80–100)
MONOCYTES # BLD AUTO: 0.56 X10(3) UL (ref 0.1–1)
MONOCYTES NFR BLD AUTO: 6.5 %
NEUTROPHILS # BLD AUTO: 5.67 X10 (3) UL (ref 1.5–7.7)
NEUTROPHILS # BLD AUTO: 5.67 X10(3) UL (ref 1.5–7.7)
NEUTROPHILS NFR BLD AUTO: 66.2 %
NONHDLC SERPL-MCNC: 139 MG/DL (ref ?–130)
OSMOLALITY SERPL CALC.SUM OF ELEC: 285 MOSM/KG (ref 275–295)
PATIENT FASTING Y/N/NP: YES
PATIENT FASTING Y/N/NP: YES
PLATELET # BLD AUTO: 377 10(3)UL (ref 150–450)
POTASSIUM SERPL-SCNC: 4 MMOL/L (ref 3.5–5.1)
RBC # BLD AUTO: 4.67 X10(6)UL (ref 3.8–5.3)
SODIUM SERPL-SCNC: 138 MMOL/L (ref 136–145)
TRIGL SERPL-MCNC: 121 MG/DL (ref 30–149)
TSI SER-ACNC: 3.19 MIU/ML (ref 0.36–3.74)
VLDLC SERPL CALC-MCNC: 24 MG/DL (ref 0–30)
WBC # BLD AUTO: 8.6 X10(3) UL (ref 4–11)

## 2020-02-08 PROCEDURE — 80050 GENERAL HEALTH PANEL: CPT | Performed by: NURSE PRACTITIONER

## 2020-02-08 PROCEDURE — 36415 COLL VENOUS BLD VENIPUNCTURE: CPT | Performed by: NURSE PRACTITIONER

## 2020-02-08 PROCEDURE — 80061 LIPID PANEL: CPT | Performed by: NURSE PRACTITIONER

## 2020-02-15 ENCOUNTER — OFFICE VISIT (OUTPATIENT)
Dept: INTERNAL MEDICINE CLINIC | Facility: CLINIC | Age: 45
End: 2020-02-15
Payer: COMMERCIAL

## 2020-02-15 VITALS
DIASTOLIC BLOOD PRESSURE: 72 MMHG | HEART RATE: 72 BPM | BODY MASS INDEX: 24.16 KG/M2 | RESPIRATION RATE: 16 BRPM | SYSTOLIC BLOOD PRESSURE: 108 MMHG | HEIGHT: 62.5 IN | WEIGHT: 134.63 LBS

## 2020-02-15 DIAGNOSIS — G43.009 MIGRAINE WITHOUT AURA AND WITHOUT STATUS MIGRAINOSUS, NOT INTRACTABLE: ICD-10-CM

## 2020-02-15 DIAGNOSIS — Z00.00 ROUTINE GENERAL MEDICAL EXAMINATION AT A HEALTH CARE FACILITY: Primary | ICD-10-CM

## 2020-02-15 DIAGNOSIS — Z12.31 ENCOUNTER FOR SCREENING MAMMOGRAM FOR MALIGNANT NEOPLASM OF BREAST: ICD-10-CM

## 2020-02-15 DIAGNOSIS — R77.8 ELEVATED TOTAL PROTEIN: ICD-10-CM

## 2020-02-15 DIAGNOSIS — R92.2 DENSE BREAST: ICD-10-CM

## 2020-02-15 PROCEDURE — 99396 PREV VISIT EST AGE 40-64: CPT | Performed by: INTERNAL MEDICINE

## 2020-02-15 RX ORDER — BUTALBITAL, ACETAMINOPHEN AND CAFFEINE 50; 325; 40 MG/1; MG/1; MG/1
1 TABLET ORAL EVERY 8 HOURS PRN
Qty: 30 TABLET | Refills: 0 | Status: SHIPPED | OUTPATIENT
Start: 2020-02-15 | End: 2021-12-23

## 2020-02-15 RX ORDER — ONDANSETRON 4 MG/1
4 TABLET, FILM COATED ORAL EVERY 8 HOURS PRN
Qty: 30 TABLET | Refills: 0 | Status: SHIPPED | OUTPATIENT
Start: 2020-02-15 | End: 2021-12-23

## 2020-02-15 NOTE — PROGRESS NOTES
Patient presents with:  Physical: cn room 4: physical       HPI:    Patient here for cpe without gyne exam  Sees Dr. Ruthanna Shone, utd on pap and mammogram. Next mammogram due in June  Declined flu vaccine, she feels sick from these apparently.   C/o severe migrai disc without myelopathy     L4-L5   • History of molar pregnancy, antepartum     x 2   • Internal derangement of knee, right 3/19/2018   • Internal hemorrhoids    • Varicella without mention of complication     Post Varicella     Past Surgical History:   P Date(s) Administered    FLUZONE 3 Yrs+ Quad Prsv Free 0.5 ml (75338)                          10/05/2017      HEP B                 04/17/2018      HEP B, Adult          04/17/2018      Influenza             10/05/2012  12/30/2015  10/06/2017      TDAP breast       Orders Placed This Encounter      Monoclonal Protein Study [E]      Bence Jones Protein, Urine      Meds & Refills for this Visit:  Requested Prescriptions     Signed Prescriptions Disp Refills   • Butalbital-APAP-Caffeine -40 MG Oral Ta

## 2020-02-29 ENCOUNTER — LABORATORY ENCOUNTER (OUTPATIENT)
Dept: LAB | Age: 45
End: 2020-02-29
Attending: INTERNAL MEDICINE
Payer: COMMERCIAL

## 2020-02-29 DIAGNOSIS — R77.8 ELEVATED TOTAL PROTEIN: ICD-10-CM

## 2020-02-29 PROCEDURE — 83883 ASSAY NEPHELOMETRY NOT SPEC: CPT | Performed by: INTERNAL MEDICINE

## 2020-02-29 PROCEDURE — 36415 COLL VENOUS BLD VENIPUNCTURE: CPT | Performed by: INTERNAL MEDICINE

## 2020-02-29 PROCEDURE — 84165 PROTEIN E-PHORESIS SERUM: CPT | Performed by: INTERNAL MEDICINE

## 2020-02-29 PROCEDURE — 86334 IMMUNOFIX E-PHORESIS SERUM: CPT | Performed by: INTERNAL MEDICINE

## 2020-03-02 ENCOUNTER — APPOINTMENT (OUTPATIENT)
Dept: LAB | Age: 45
End: 2020-03-02
Attending: INTERNAL MEDICINE
Payer: COMMERCIAL

## 2020-03-02 DIAGNOSIS — R77.8 ELEVATED TOTAL PROTEIN: ICD-10-CM

## 2020-03-02 LAB
ALBUMIN SERPL ELPH-MCNC: 4.02 G/DL (ref 3.75–5.21)
ALBUMIN/GLOB SERPL: 1.3 {RATIO} (ref 1–2)
ALPHA1 GLOB SERPL ELPH-MCNC: 0.36 G/DL (ref 0.19–0.46)
ALPHA2 GLOB SERPL ELPH-MCNC: 0.77 G/DL (ref 0.48–1.05)
B-GLOBULIN SERPL ELPH-MCNC: 0.84 G/DL (ref 0.68–1.23)
GAMMA GLOB SERPL ELPH-MCNC: 1.11 G/DL (ref 0.62–1.7)
M PROTEIN MFR SERPL ELPH: 7.1 G/DL (ref 6.4–8.2)

## 2020-03-02 PROCEDURE — 86335 IMMUNFIX E-PHORSIS/URINE/CSF: CPT | Performed by: INTERNAL MEDICINE

## 2020-03-02 PROCEDURE — 84156 ASSAY OF PROTEIN URINE: CPT | Performed by: INTERNAL MEDICINE

## 2020-03-02 PROCEDURE — 83883 ASSAY NEPHELOMETRY NOT SPEC: CPT | Performed by: INTERNAL MEDICINE

## 2020-03-03 LAB
KAPPA FREE LIGHT CHAIN: 1.05 MG/DL (ref 0.33–1.94)
KAPPA/LAMBDA FLC RATIO: 0.83 (ref 0.26–1.65)
LAMBDA FREE LIGHT CHAIN: 1.27 MG/DL (ref 0.57–2.63)

## 2020-03-05 LAB
FREE UR LAMBDA EXCRETION/DAY: 1.57 MG/D
FREE UR LAMBDA LIGHT CHAIN: 1.12 MG/L
FREE URINARY KAPPA EXCRETION/D: 16.95 MG/D
FREE URINARY KAPPA LIGHT CHAIN: 12.11 MG/L
HOURS COLLECTED: 24 HR
TOTAL VOLUME: 1400 ML

## 2020-03-10 ENCOUNTER — TELEPHONE (OUTPATIENT)
Dept: INTERNAL MEDICINE CLINIC | Facility: CLINIC | Age: 45
End: 2020-03-10

## 2020-03-11 ENCOUNTER — OFFICE VISIT (OUTPATIENT)
Dept: INTERNAL MEDICINE CLINIC | Facility: CLINIC | Age: 45
End: 2020-03-11
Payer: COMMERCIAL

## 2020-03-11 VITALS
TEMPERATURE: 99 F | RESPIRATION RATE: 16 BRPM | WEIGHT: 136.81 LBS | BODY MASS INDEX: 24.55 KG/M2 | HEIGHT: 62.5 IN | HEART RATE: 88 BPM | SYSTOLIC BLOOD PRESSURE: 100 MMHG | OXYGEN SATURATION: 98 % | DIASTOLIC BLOOD PRESSURE: 64 MMHG

## 2020-03-11 DIAGNOSIS — R05.9 COUGH: ICD-10-CM

## 2020-03-11 DIAGNOSIS — J02.0 STREP THROAT: Primary | ICD-10-CM

## 2020-03-11 PROCEDURE — 99213 OFFICE O/P EST LOW 20 MIN: CPT | Performed by: INTERNAL MEDICINE

## 2020-03-11 RX ORDER — ALBUTEROL SULFATE 90 UG/1
2 AEROSOL, METERED RESPIRATORY (INHALATION) EVERY 4 HOURS PRN
Qty: 1 INHALER | Refills: 1 | Status: SHIPPED | OUTPATIENT
Start: 2020-03-11 | End: 2020-09-09 | Stop reason: ALTCHOICE

## 2020-03-11 RX ORDER — BENZONATATE 200 MG/1
200 CAPSULE ORAL 3 TIMES DAILY PRN
Qty: 30 CAPSULE | Refills: 0 | Status: SHIPPED | OUTPATIENT
Start: 2020-03-11 | End: 2020-09-09 | Stop reason: ALTCHOICE

## 2020-03-11 RX ORDER — AZITHROMYCIN 250 MG/1
TABLET, FILM COATED ORAL
Qty: 6 TABLET | Refills: 0 | Status: SHIPPED | OUTPATIENT
Start: 2020-03-11 | End: 2020-09-09 | Stop reason: ALTCHOICE

## 2020-03-11 NOTE — TELEPHONE ENCOUNTER
Triage her for fever, cough and recent travel to [facility]. Was she on a cruise?   Coming in tomorrow morning

## 2020-03-11 NOTE — PROGRESS NOTES
Del Freed is a 53year old female. Patient presents with:   Other: cn room 4: coughing, sore throat, fever      HPI:     c/o scratchy throat, fevers, congestion last week while visiting her grandfather in [facility]. She went to a local doctor in [facility], Cervical high risk HPV (human papillomavirus) test positive 12/2012   • Cholelithiasis    • Chronic sinusitis    • Eczema    • General counseling for initiation of other contraceptive measures    • Herniated lumbar disc without myelopathy     L4-L5   • His prn    No orders of the defined types were placed in this encounter.       Meds & Refills for this Visit:  Requested Prescriptions     Signed Prescriptions Disp Refills   • azithromycin (ZITHROMAX Z-DARRION) 250 MG Oral Tab 6 tablet 0     Si tabs po the fir

## 2020-03-17 ENCOUNTER — TELEPHONE (OUTPATIENT)
Dept: INTERNAL MEDICINE CLINIC | Facility: CLINIC | Age: 45
End: 2020-03-17

## 2020-03-17 ENCOUNTER — HOSPITAL ENCOUNTER (OUTPATIENT)
Dept: GENERAL RADIOLOGY | Age: 45
Discharge: HOME | End: 2020-03-17
Attending: INTERNAL MEDICINE
Payer: COMMERCIAL

## 2020-03-17 DIAGNOSIS — R05.3 PERSISTENT COUGH: ICD-10-CM

## 2020-03-17 DIAGNOSIS — R05.3 PERSISTENT COUGH: Primary | ICD-10-CM

## 2020-03-17 PROCEDURE — 71046 X-RAY EXAM CHEST 2 VIEWS: CPT | Performed by: INTERNAL MEDICINE

## 2020-03-17 RX ORDER — METHYLPREDNISOLONE 4 MG/1
TABLET ORAL
Qty: 1 KIT | Refills: 0 | Status: SHIPPED | OUTPATIENT
Start: 2020-03-17 | End: 2020-09-09 | Stop reason: ALTCHOICE

## 2020-08-21 ENCOUNTER — HOSPITAL ENCOUNTER (OUTPATIENT)
Dept: MAMMOGRAPHY | Facility: HOSPITAL | Age: 45
Discharge: HOME | End: 2020-08-21
Attending: OBSTETRICS & GYNECOLOGY
Payer: COMMERCIAL

## 2020-08-21 DIAGNOSIS — R92.2 DENSE BREAST: ICD-10-CM

## 2020-08-21 DIAGNOSIS — Z12.31 ENCOUNTER FOR SCREENING MAMMOGRAM FOR MALIGNANT NEOPLASM OF BREAST: ICD-10-CM

## 2020-08-21 PROCEDURE — 77067 SCR MAMMO BI INCL CAD: CPT | Performed by: INTERNAL MEDICINE

## 2020-08-21 PROCEDURE — 77063 BREAST TOMOSYNTHESIS BI: CPT | Performed by: INTERNAL MEDICINE

## 2020-08-26 ENCOUNTER — HOSPITAL ENCOUNTER (OUTPATIENT)
Dept: ULTRASOUND IMAGING | Age: 45
Discharge: HOME | End: 2020-08-26
Attending: OBSTETRICS & GYNECOLOGY
Payer: COMMERCIAL

## 2020-08-26 DIAGNOSIS — N85.2 ENLARGED UTERUS: ICD-10-CM

## 2020-08-26 PROCEDURE — 76856 US EXAM PELVIC COMPLETE: CPT | Performed by: OBSTETRICS & GYNECOLOGY

## 2020-08-26 PROCEDURE — 76830 TRANSVAGINAL US NON-OB: CPT | Performed by: OBSTETRICS & GYNECOLOGY

## 2020-09-09 ENCOUNTER — OFFICE VISIT (OUTPATIENT)
Dept: INTERNAL MEDICINE CLINIC | Facility: CLINIC | Age: 45
End: 2020-09-09
Payer: COMMERCIAL

## 2020-09-09 VITALS
DIASTOLIC BLOOD PRESSURE: 60 MMHG | HEART RATE: 64 BPM | SYSTOLIC BLOOD PRESSURE: 86 MMHG | TEMPERATURE: 97 F | BODY MASS INDEX: 25 KG/M2 | RESPIRATION RATE: 16 BRPM | WEIGHT: 137 LBS

## 2020-09-09 DIAGNOSIS — R82.90 ABNORMAL URINE ODOR: Primary | ICD-10-CM

## 2020-09-09 DIAGNOSIS — N30.00 ACUTE CYSTITIS WITHOUT HEMATURIA: ICD-10-CM

## 2020-09-09 DIAGNOSIS — M62.838 CERVICAL PARASPINAL MUSCLE SPASM: ICD-10-CM

## 2020-09-09 LAB
BILIRUBIN: NEGATIVE
GLUCOSE (URINE DIPSTICK): NEGATIVE MG/DL
KETONES (URINE DIPSTICK): NEGATIVE MG/DL
MULTISTIX LOT#: NORMAL NUMERIC
NITRITE, URINE: POSITIVE
PH, URINE: 5.5 (ref 4.5–8)
PROTEIN (URINE DIPSTICK): 100 MG/DL
SPECIFIC GRAVITY: 1.02 (ref 1–1.03)
URINE-COLOR: YELLOW
UROBILINOGEN,SEMI-QN: 0.2 MG/DL (ref 0–1.9)

## 2020-09-09 PROCEDURE — 3078F DIAST BP <80 MM HG: CPT | Performed by: INTERNAL MEDICINE

## 2020-09-09 PROCEDURE — 87088 URINE BACTERIA CULTURE: CPT | Performed by: INTERNAL MEDICINE

## 2020-09-09 PROCEDURE — 3074F SYST BP LT 130 MM HG: CPT | Performed by: INTERNAL MEDICINE

## 2020-09-09 PROCEDURE — 81003 URINALYSIS AUTO W/O SCOPE: CPT | Performed by: INTERNAL MEDICINE

## 2020-09-09 PROCEDURE — 99213 OFFICE O/P EST LOW 20 MIN: CPT | Performed by: INTERNAL MEDICINE

## 2020-09-09 PROCEDURE — 87186 SC STD MICRODIL/AGAR DIL: CPT | Performed by: INTERNAL MEDICINE

## 2020-09-09 PROCEDURE — 87086 URINE CULTURE/COLONY COUNT: CPT | Performed by: INTERNAL MEDICINE

## 2020-09-09 RX ORDER — SULFAMETHOXAZOLE AND TRIMETHOPRIM 800; 160 MG/1; MG/1
1 TABLET ORAL 2 TIMES DAILY
Qty: 6 TABLET | Refills: 0 | Status: SHIPPED | OUTPATIENT
Start: 2020-09-09 | End: 2020-09-12

## 2020-09-09 RX ORDER — CYCLOBENZAPRINE HCL 10 MG
10 TABLET ORAL NIGHTLY PRN
Qty: 10 TABLET | Refills: 1 | Status: SHIPPED | OUTPATIENT
Start: 2020-09-09 | End: 2020-09-19

## 2020-09-09 NOTE — PROGRESS NOTES
Petey Voss  7/29/1975    Patient presents with:  Urinary Symptoms: SN Rm 10; started 9/7, burning/irritation, foul smelling, frequency      SUBJECTIVE   Petey Voss is a 50year old female who presents with foul-smelling urine.     The patient descri Package 0   • Butalbital-APAP-Caffeine -40 MG Oral Tab Take 1 tablet by mouth every 8 (eight) hours as needed for Headaches.  30 tablet 0   • Ondansetron HCl (ZOFRAN) 4 mg tablet Take 1 tablet (4 mg total) by mouth every 8 (eight) hours as needed for tobacco: Never Used    Alcohol use: No      Alcohol/week: 0.0 standard drinks    Drug use: No        OBJECTIVE:   BP (!) 86/60 (BP Location: Left arm, Patient Position: Sitting, Cuff Size: adult)   Pulse 64   Temp 97.2 °F (36.2 °C) (Temporal)   Resp 16   W 10 MG Oral Tab 10 tablet 1     Sig: Take 1 tablet (10 mg total) by mouth nightly as needed. • Sulfamethoxazole-TMP DS (BACTRIM DS) 800-160 MG Oral Tab per tablet 6 tablet 0     Sig: Take 1 tablet by mouth 2 (two) times daily for 3 days.        Imaging & C

## 2020-10-02 ENCOUNTER — OFFICE VISIT (OUTPATIENT)
Dept: SURGERY | Facility: CLINIC | Age: 45
End: 2020-10-02
Payer: COMMERCIAL

## 2020-10-02 VITALS
RESPIRATION RATE: 16 BRPM | SYSTOLIC BLOOD PRESSURE: 100 MMHG | HEART RATE: 62 BPM | WEIGHT: 136 LBS | DIASTOLIC BLOOD PRESSURE: 68 MMHG | BODY MASS INDEX: 24 KG/M2

## 2020-10-02 DIAGNOSIS — G44.209 TENSION-TYPE HEADACHE, NOT INTRACTABLE, UNSPECIFIED CHRONICITY PATTERN: ICD-10-CM

## 2020-10-02 DIAGNOSIS — G43.001 MIGRAINE WITHOUT AURA AND WITH STATUS MIGRAINOSUS, NOT INTRACTABLE: Primary | ICD-10-CM

## 2020-10-02 PROBLEM — G43.009 MIGRAINE WITHOUT AURA, NOT INTRACTABLE: Status: ACTIVE | Noted: 2020-10-02

## 2020-10-02 PROBLEM — Z72.820 POOR SLEEP: Status: ACTIVE | Noted: 2020-10-02

## 2020-10-02 PROCEDURE — 3078F DIAST BP <80 MM HG: CPT | Performed by: OTHER

## 2020-10-02 PROCEDURE — 99204 OFFICE O/P NEW MOD 45 MIN: CPT | Performed by: OTHER

## 2020-10-02 PROCEDURE — 3074F SYST BP LT 130 MM HG: CPT | Performed by: OTHER

## 2020-10-02 RX ORDER — AMITRIPTYLINE HYDROCHLORIDE 10 MG/1
TABLET, FILM COATED ORAL
Qty: 60 TABLET | Refills: 11 | Status: SHIPPED | OUTPATIENT
Start: 2020-10-02 | End: 2021-09-22

## 2020-10-02 RX ORDER — SUMATRIPTAN 50 MG/1
50 TABLET, FILM COATED ORAL AS NEEDED
COMMUNITY
End: 2020-11-30

## 2020-10-02 RX ORDER — CYCLOBENZAPRINE HCL 10 MG
10 TABLET ORAL NIGHTLY PRN
COMMUNITY
End: 2020-10-02

## 2020-10-02 RX ORDER — RIZATRIPTAN BENZOATE 10 MG/1
TABLET, ORALLY DISINTEGRATING ORAL
Qty: 12 TABLET | Refills: 5 | Status: SHIPPED | OUTPATIENT
Start: 2020-10-02 | End: 2021-10-19

## 2020-10-02 NOTE — PROGRESS NOTES
[facility]   Neurology; INITIAL CLINIC VISIT  10/2/2020, 9:19 AM     Norbert Fernandes Patient Status:  No patient class for patient encounter    1975 MRN [id number]   Location [facility] Varicella       PAST SURGICAL HISTORY:  Past Surgical History:   Procedure Laterality Date   • BACK SURGERY  L4/5    December 2007: [facility], Disc removal   • BENIGN BIOPSY LEFT Left 1996???   • CHOLECYSTECTOMY      June 2006   • COLONOSCOP Nasal route 2 (two) times daily. 1 Bottle 3   • SUMAtriptan Succinate 50 MG Oral Tab Take 50 mg by mouth as needed for Migraine.            REVIEW OF SYSTEMS:    GENERAL HEALTH:  feels well, calm, normal appetite,   EYES: no visual complaints or deficits  H II:  Visual fields full, Pupils equal and reactive, Fundi normal       CN III, IV, VI:  Horrizontal and vertical movements normal.  Normal pursuit and saccades.                             No nystagmus, no ptosis       CN V: Masseters strong, Facial sensati today is normal     Return in about 3 months (around 1/2/2021). We discussed in depth regarding diagnosis, prognosis, treatment. Side effect of medications were discussed in details. The patient was  given ample opportunity to ask questions.  Angel moss

## 2020-10-02 NOTE — PROGRESS NOTES
Patient here for evaluation of migraines. Have been occurring last couple of years, progressively worse.

## 2020-11-30 RX ORDER — SUMATRIPTAN 50 MG/1
TABLET, FILM COATED ORAL
Qty: 6 TABLET | Refills: 0 | Status: SHIPPED | OUTPATIENT
Start: 2020-11-30 | End: 2021-10-19

## 2020-11-30 NOTE — TELEPHONE ENCOUNTER
Last OV: 9/9/20 acute f/u    No future appointments. Latest labs: 2/8/20 CMP, CBC, TSH and Lipid    Latest RX: SUMATRIPTAN SUCC 50 MG TABLET 6 tabs 0 refills on 12/30/19    Per protocol, not on protocol. Rx pending.

## 2021-02-09 ENCOUNTER — OFFICE VISIT (OUTPATIENT)
Dept: FAMILY MEDICINE CLINIC | Facility: CLINIC | Age: 46
End: 2021-02-09
Payer: COMMERCIAL

## 2021-02-09 VITALS
DIASTOLIC BLOOD PRESSURE: 62 MMHG | BODY MASS INDEX: 24.27 KG/M2 | RESPIRATION RATE: 18 BRPM | SYSTOLIC BLOOD PRESSURE: 120 MMHG | HEIGHT: 63 IN | OXYGEN SATURATION: 100 % | TEMPERATURE: 98 F | HEART RATE: 70 BPM | WEIGHT: 137 LBS

## 2021-02-09 DIAGNOSIS — Z20.822 SUSPECTED COVID-19 VIRUS INFECTION: ICD-10-CM

## 2021-02-09 DIAGNOSIS — J02.9 SORE THROAT: Primary | ICD-10-CM

## 2021-02-09 DIAGNOSIS — H66.002 NON-RECURRENT ACUTE SUPPURATIVE OTITIS MEDIA OF LEFT EAR WITHOUT SPONTANEOUS RUPTURE OF TYMPANIC MEMBRANE: ICD-10-CM

## 2021-02-09 LAB
CONTROL LINE PRESENT WITH A CLEAR BACKGROUND (YES/NO): YES YES/NO
KIT LOT #: NORMAL NUMERIC
STREP GRP A CUL-SCR: NEGATIVE

## 2021-02-09 PROCEDURE — 99213 OFFICE O/P EST LOW 20 MIN: CPT | Performed by: NURSE PRACTITIONER

## 2021-02-09 PROCEDURE — 3074F SYST BP LT 130 MM HG: CPT | Performed by: NURSE PRACTITIONER

## 2021-02-09 PROCEDURE — 3078F DIAST BP <80 MM HG: CPT | Performed by: NURSE PRACTITIONER

## 2021-02-09 PROCEDURE — 3008F BODY MASS INDEX DOCD: CPT | Performed by: NURSE PRACTITIONER

## 2021-02-09 PROCEDURE — 87880 STREP A ASSAY W/OPTIC: CPT | Performed by: NURSE PRACTITIONER

## 2021-02-09 RX ORDER — AMOXICILLIN 875 MG/1
875 TABLET, COATED ORAL 2 TIMES DAILY
Qty: 20 TABLET | Refills: 0 | Status: SHIPPED | OUTPATIENT
Start: 2021-02-09 | End: 2021-02-19

## 2021-02-09 NOTE — PATIENT INSTRUCTIONS
Middle Ear Infection (Adult)   You have an infection of the middle ear, the space behind the eardrum. This is also called acute otitis media (AOM). Sometimes it's caused by the common cold.  This is because congestion can block the internal passage (eusta Coronavirus Disease 2019 (COVID-19): Caring for Yourself or Others  If you or a household member have symptoms of COVID-19, follow these guidelines for preventing spread of the virus, and managing symptoms.   If you think you have COVID-19 symptoms  · Stay · Tell the healthcare staff about recent travel. This includes local travel on public transport. Staff may need to find other people you have been in contact with. · Follow all instructions the healthcare staff give you.     If you have been diagnosed with The FDA has approved a vaccine to prevent COVID-19 in people 16 years and older who are not pregnant or breastfeeding. It's not currently available to the entire public.  The first phase of vaccine roll-out will go to healthcare staff and residents of long- If you've had confirmed COVID-19, your healthcare team may ask you to consider donating your plasma. This is called COVID-19 convalescent plasma donation.  Plasma from people fully recovered from COVID-19 may contain antibodies to help fight COVID-19 in peo Your limits are different if you've had COVID-19 in the last 3 months but are fully recovered without symptoms and you have been exposed to someone with COVID-19. If you are symptom-free, you don't need to stay home away from others or be retested.  The CDC When you return to public settings  When you are well enough to go outside your home, consider the CDC's guidance on cloth face masks:     · The CDC advises all people over age 2 to wear cloth face masks in public settings when around people outside of the © 7356-3002 The [address]. All rights reserved. This information is not intended as a substitute for professional medical care. Always follow your healthcare professional's instructions.

## 2021-02-09 NOTE — PROGRESS NOTES
CHIEF COMPLAINT:   Patient presents with:  Sore Throat: S/S for 4 days with headache and runny nose. Denies fever. Crackling ear sensation bilterally, denies otalgia. Sore throat. Denies cough.  Has taken Motrin for headache and Allegra with no relief at • Fluticasone Propionate 50 MCG/ACT Nasal Suspension 1 spray by Nasal route 2 (two) times daily.  1 Bottle 3      Past Medical History:   Diagnosis Date   • Allergic rhinitis    • BACK PAIN    • Cervical high risk HPV (human papillomavirus) test positive 12 LUNGS: clear to auscultation bilaterally, no wheezes or rhonchi. No diminished breath sounds. Breathing is non labored.   CARDIO: RRR without murmur  EXTREMITIES: no cyanosis, clubbing or edema  LYMPH: no auricular lymphadenopathy; bilateral anterior cervic You have an infection of the middle ear, the space behind the eardrum. This is also called acute otitis media (AOM). Sometimes it's caused by the common cold.  This is because congestion can block the internal passage (eustachian tube) that drains fluid fro If you or a household member have symptoms of COVID-19, follow these guidelines for preventing spread of the virus, and managing symptoms. If you think you have COVID-19 symptoms  · Stay home.  Call your healthcare provider and tell them you have symptoms · Follow all instructions the healthcare staff give you. If you have been diagnosed with COVID-19  · Stay home and start self-isolation. Don’t leave your home unless you need to get medical care. Don't go to work, school, or public areas.  Don't use publ The FDA has approved a vaccine to prevent COVID-19 in people 16 years and older who are not pregnant or breastfeeding. It's not currently available to the entire public.  The first phase of vaccine roll-out will go to healthcare staff and residents of long- If you've had confirmed COVID-19, your healthcare team may ask you to consider donating your plasma. This is called COVID-19 convalescent plasma donation.  Plasma from people fully recovered from COVID-19 may contain antibodies to help fight COVID-19 in peo Your limits are different if you've had COVID-19 in the last 3 months but are fully recovered without symptoms and you have been exposed to someone with COVID-19. If you are symptom-free, you don't need to stay home away from others or be retested.  The CDC When you return to public settings  When you are well enough to go outside your home, consider the CDC's guidance on cloth face masks:     · The CDC advises all people over age 2 to wear cloth face masks in public settings when around people outside of the © 9331-1063 [employer] [address]. All rights reserved. This information is not intended as a substitute for professional medical care. Always follow your healthcare professional's instructions.             Patient voiced understanding and is in agreeme

## 2021-02-11 LAB — SARS-COV-2 RNA RESP QL NAA+PROBE: NOT DETECTED

## 2021-05-21 ENCOUNTER — OFFICE VISIT (OUTPATIENT)
Dept: INTERNAL MEDICINE CLINIC | Facility: CLINIC | Age: 46
End: 2021-05-21
Payer: COMMERCIAL

## 2021-05-21 VITALS
HEART RATE: 68 BPM | HEIGHT: 63 IN | WEIGHT: 142 LBS | SYSTOLIC BLOOD PRESSURE: 106 MMHG | TEMPERATURE: 98 F | BODY MASS INDEX: 25.16 KG/M2 | RESPIRATION RATE: 16 BRPM | DIASTOLIC BLOOD PRESSURE: 60 MMHG

## 2021-05-21 DIAGNOSIS — R30.0 DYSURIA: Primary | ICD-10-CM

## 2021-05-21 PROCEDURE — 3008F BODY MASS INDEX DOCD: CPT | Performed by: INTERNAL MEDICINE

## 2021-05-21 PROCEDURE — 3074F SYST BP LT 130 MM HG: CPT | Performed by: INTERNAL MEDICINE

## 2021-05-21 PROCEDURE — 3078F DIAST BP <80 MM HG: CPT | Performed by: INTERNAL MEDICINE

## 2021-05-21 PROCEDURE — 81003 URINALYSIS AUTO W/O SCOPE: CPT | Performed by: INTERNAL MEDICINE

## 2021-05-21 PROCEDURE — 87086 URINE CULTURE/COLONY COUNT: CPT | Performed by: INTERNAL MEDICINE

## 2021-05-21 PROCEDURE — 99213 OFFICE O/P EST LOW 20 MIN: CPT | Performed by: INTERNAL MEDICINE

## 2021-05-21 RX ORDER — NITROFURANTOIN 25; 75 MG/1; MG/1
100 CAPSULE ORAL 2 TIMES DAILY
Qty: 10 CAPSULE | Refills: 0 | Status: SHIPPED | OUTPATIENT
Start: 2021-05-21 | End: 2021-10-19 | Stop reason: ALTCHOICE

## 2021-05-21 NOTE — PROGRESS NOTES
Tarah Whitney is a 50year old female. Patient presents with:  Urinary Symptoms: SN Rm 3; x 1wk, discomfort, burning, stinging, frequency      HPI:     C/o dysuria and bad odor to urine for about a week. Symptoms on and off.  Drinking more water but sympt History:   Diagnosis Date   • Allergic rhinitis    • BACK PAIN    • Cervical high risk HPV (human papillomavirus) test positive 12/2012   • Cholelithiasis    • Chronic sinusitis    • Eczema    • General counseling for initiation of other contraceptive gricelda symptoms persist after ABX course    Orders Placed This Encounter      Urine Dip, auto without Micro      Urine Culture, Routine [E]      Meds & Refills for this Visit:  Requested Prescriptions     Signed Prescriptions Disp Refills   • Nitrofurantoin Monoh

## 2021-08-23 ENCOUNTER — OFFICE VISIT (OUTPATIENT)
Dept: FAMILY MEDICINE CLINIC | Facility: CLINIC | Age: 46
End: 2021-08-23
Payer: COMMERCIAL

## 2021-08-23 VITALS
OXYGEN SATURATION: 99 % | TEMPERATURE: 98 F | SYSTOLIC BLOOD PRESSURE: 100 MMHG | DIASTOLIC BLOOD PRESSURE: 60 MMHG | HEART RATE: 70 BPM

## 2021-08-23 DIAGNOSIS — J06.9 VIRAL UPPER RESPIRATORY TRACT INFECTION: Primary | ICD-10-CM

## 2021-08-23 DIAGNOSIS — H61.22 IMPACTED CERUMEN OF LEFT EAR: ICD-10-CM

## 2021-08-23 PROCEDURE — 3078F DIAST BP <80 MM HG: CPT | Performed by: FAMILY MEDICINE

## 2021-08-23 PROCEDURE — 99213 OFFICE O/P EST LOW 20 MIN: CPT | Performed by: FAMILY MEDICINE

## 2021-08-23 PROCEDURE — 3074F SYST BP LT 130 MM HG: CPT | Performed by: FAMILY MEDICINE

## 2021-08-23 NOTE — PROGRESS NOTES
CHIEF COMPLAINT:   Patient presents with:  Ear Problem: Have some congestion and nasal drip - Entered by patient      HPI:   Dick Hansen is a 48year old female who presents to clinic today with complaints of bilateral ear discomfort and general cold sy total) by mouth every 8 (eight) hours as needed for Nausea., Disp: 30 tablet, Rfl: 0  Fluticasone Propionate 50 MCG/ACT Nasal Suspension, 1 spray by Nasal route 2 (two) times daily. , Disp: 1 Bottle, Rfl: 3    No current facility-administered medications fo landmarks present. Left TM: Pearly, no bulging, no retraction, no effusion, bony landmarks present. NOSE: nostrils patent, no exudates, nasal mucosa pink and noninflamed  THROAT: oral mucosa pink, moist. Posterior pharynx is not erythematous or injected.

## 2021-08-23 NOTE — PATIENT INSTRUCTIONS
Continue allergy regimen. Consider adding saline sprays or over-the-counter decongestants for congestion and ear pressure. Also, after instilling Flonase, you should perform a Valsalva to help medication get into the eustachian tubes more deeply.   (Doc Hang

## 2021-09-22 DIAGNOSIS — G43.001 MIGRAINE WITHOUT AURA AND WITH STATUS MIGRAINOSUS, NOT INTRACTABLE: Primary | ICD-10-CM

## 2021-09-22 RX ORDER — AMITRIPTYLINE HYDROCHLORIDE 10 MG/1
TABLET, FILM COATED ORAL
Qty: 60 TABLET | Refills: 0 | Status: SHIPPED | OUTPATIENT
Start: 2021-09-22 | End: 2021-10-19

## 2021-09-22 NOTE — TELEPHONE ENCOUNTER
Patient calling to request refill of Amitriptyline  Pharmacy   [place] [id number] [facility], [address] [facility]  [phone] AT [facility] OF [address], [phone], [phone]    Patient informed of 48 hour refill policy excluding weekends and holi

## 2021-09-22 NOTE — TELEPHONE ENCOUNTER
Medication: Amitriptyline    Date of last refill: 10/2/20 (#60/11)  [place]     Last office visit: 10/2/2020  Due back to clinic per last office note:  3 months  Date next office visit scheduled:    Future Appointments

## 2021-10-07 DIAGNOSIS — G43.001 MIGRAINE WITHOUT AURA AND WITH STATUS MIGRAINOSUS, NOT INTRACTABLE: ICD-10-CM

## 2021-10-07 RX ORDER — AMITRIPTYLINE HYDROCHLORIDE 10 MG/1
TABLET, FILM COATED ORAL
Qty: 540 TABLET | Refills: 0 | OUTPATIENT
Start: 2021-10-07

## 2021-10-19 ENCOUNTER — TELEPHONE (OUTPATIENT)
Dept: INTERNAL MEDICINE CLINIC | Facility: CLINIC | Age: 46
End: 2021-10-19

## 2021-10-19 ENCOUNTER — OFFICE VISIT (OUTPATIENT)
Dept: NEUROLOGY | Facility: CLINIC | Age: 46
End: 2021-10-19
Payer: COMMERCIAL

## 2021-10-19 VITALS
BODY MASS INDEX: 26 KG/M2 | DIASTOLIC BLOOD PRESSURE: 72 MMHG | HEART RATE: 70 BPM | SYSTOLIC BLOOD PRESSURE: 118 MMHG | RESPIRATION RATE: 16 BRPM | WEIGHT: 144 LBS

## 2021-10-19 DIAGNOSIS — Z23 NEED FOR VACCINATION: Primary | ICD-10-CM

## 2021-10-19 DIAGNOSIS — G43.001 MIGRAINE WITHOUT AURA AND WITH STATUS MIGRAINOSUS, NOT INTRACTABLE: ICD-10-CM

## 2021-10-19 DIAGNOSIS — Z13.29 SCREENING FOR THYROID DISORDER: ICD-10-CM

## 2021-10-19 DIAGNOSIS — Z13.220 SCREENING FOR LIPID DISORDERS: ICD-10-CM

## 2021-10-19 DIAGNOSIS — Z13.228 SCREENING FOR METABOLIC DISORDER: ICD-10-CM

## 2021-10-19 DIAGNOSIS — Z13.0 SCREENING FOR BLOOD DISEASE: ICD-10-CM

## 2021-10-19 DIAGNOSIS — Z00.00 ROUTINE GENERAL MEDICAL EXAMINATION AT A HEALTH CARE FACILITY: Primary | ICD-10-CM

## 2021-10-19 PROCEDURE — 90471 IMMUNIZATION ADMIN: CPT | Performed by: OTHER

## 2021-10-19 PROCEDURE — 99213 OFFICE O/P EST LOW 20 MIN: CPT | Performed by: OTHER

## 2021-10-19 PROCEDURE — 90686 IIV4 VACC NO PRSV 0.5 ML IM: CPT | Performed by: OTHER

## 2021-10-19 PROCEDURE — 3078F DIAST BP <80 MM HG: CPT | Performed by: OTHER

## 2021-10-19 PROCEDURE — 3074F SYST BP LT 130 MM HG: CPT | Performed by: OTHER

## 2021-10-19 RX ORDER — AMITRIPTYLINE HYDROCHLORIDE 10 MG/1
TABLET, FILM COATED ORAL
Qty: 180 TABLET | Refills: 3 | Status: SHIPPED | OUTPATIENT
Start: 2021-10-19

## 2021-10-19 RX ORDER — RIZATRIPTAN BENZOATE 10 MG/1
TABLET, ORALLY DISINTEGRATING ORAL
Qty: 36 TABLET | Refills: 3 | Status: SHIPPED | OUTPATIENT
Start: 2021-10-19

## 2021-10-19 NOTE — TELEPHONE ENCOUNTER
Orders to [facility]  Pt aware to get labs done no sooner than 2 weeks prior to the appt. Pt aware to fast.  No call back required.     Future Appointments   Date Time Provider Jayshree Tucker   12/23/2021  2:20 PM Kasandra Jaime MD [facility]

## 2021-10-24 ENCOUNTER — APPOINTMENT (OUTPATIENT)
Dept: GENERAL RADIOLOGY | Age: 46
End: 2021-10-24
Attending: PHYSICIAN ASSISTANT
Payer: COMMERCIAL

## 2021-10-24 ENCOUNTER — HOSPITAL ENCOUNTER (OUTPATIENT)
Age: 46
Discharge: HOME | End: 2021-10-24
Payer: COMMERCIAL

## 2021-10-24 VITALS
TEMPERATURE: 98 F | HEIGHT: 63 IN | DIASTOLIC BLOOD PRESSURE: 71 MMHG | OXYGEN SATURATION: 99 % | BODY MASS INDEX: 24.8 KG/M2 | RESPIRATION RATE: 16 BRPM | HEART RATE: 72 BPM | WEIGHT: 140 LBS | SYSTOLIC BLOOD PRESSURE: 108 MMHG

## 2021-10-24 DIAGNOSIS — M77.8 TOE TENDONITIS: ICD-10-CM

## 2021-10-24 DIAGNOSIS — M79.674 PAIN OF RIGHT GREAT TOE: Primary | ICD-10-CM

## 2021-10-24 PROCEDURE — 73630 X-RAY EXAM OF FOOT: CPT | Performed by: PHYSICIAN ASSISTANT

## 2021-10-24 PROCEDURE — 99213 OFFICE O/P EST LOW 20 MIN: CPT | Performed by: PHYSICIAN ASSISTANT

## 2021-10-24 PROCEDURE — L3260 AMBULATORY SURGICAL BOOT EAC: HCPCS | Performed by: PHYSICIAN ASSISTANT

## 2021-10-24 RX ORDER — DICLOFENAC SODIUM 75 MG/1
75 TABLET, DELAYED RELEASE ORAL 2 TIMES DAILY
Qty: 28 TABLET | Refills: 0 | Status: SHIPPED | OUTPATIENT
Start: 2021-10-24

## 2021-10-24 NOTE — ED PROVIDER NOTES
Patient Seen in: Immediate [address] [place] [address]      History   Patient presents with:  Pain    Stated Complaint: pulled rt toe x 2 days    Subjective:   HPI    78-year-old female presents to immediate care for 2 weeks of plantar toe pain.   Patient state effort is normal. No respiratory distress. Breath sounds: Normal breath sounds. Musculoskeletal:      Cervical back: Normal range of motion. Comments: Right foot there is no heat/erythema/pus. Distal sensation intact.   Patient has blue toenail tachypnea, good oxygen saturation, feel we can discharge home.                              Disposition and Plan     Clinical Impression:  Pain of right great toe  (primary encounter diagnosis)  Toe tendonitis     Disposition:  Discharge  10/24/2021  3:18 p

## 2021-10-24 NOTE — ED INITIAL ASSESSMENT (HPI)
Patient reports right big toe pain x 2 weeks, no specific injury. Denies any redness or swelling. Pain shoots underneath foot at times.  Pain worse with weightbearing and walking up stairs,

## 2021-12-02 ENCOUNTER — TELEPHONE (OUTPATIENT)
Dept: INTERNAL MEDICINE CLINIC | Facility: CLINIC | Age: 46
End: 2021-12-02

## 2021-12-07 ENCOUNTER — HOSPITAL ENCOUNTER (OUTPATIENT)
Dept: MRI IMAGING | Age: 46
Discharge: HOME | End: 2021-12-07
Attending: PODIATRIST
Payer: COMMERCIAL

## 2021-12-07 DIAGNOSIS — M25.80 SESAMOIDITIS: ICD-10-CM

## 2021-12-07 PROCEDURE — 73718 MRI LOWER EXTREMITY W/O DYE: CPT | Performed by: PODIATRIST

## 2021-12-08 ENCOUNTER — OFFICE VISIT (OUTPATIENT)
Dept: FAMILY MEDICINE CLINIC | Facility: CLINIC | Age: 46
End: 2021-12-08
Payer: COMMERCIAL

## 2021-12-08 VITALS
SYSTOLIC BLOOD PRESSURE: 96 MMHG | DIASTOLIC BLOOD PRESSURE: 64 MMHG | TEMPERATURE: 98 F | HEIGHT: 63 IN | HEART RATE: 81 BPM | BODY MASS INDEX: 24.8 KG/M2 | WEIGHT: 140 LBS | RESPIRATION RATE: 16 BRPM | OXYGEN SATURATION: 99 %

## 2021-12-08 DIAGNOSIS — Z20.822 ENCOUNTER FOR LABORATORY TESTING FOR COVID-19 VIRUS: ICD-10-CM

## 2021-12-08 DIAGNOSIS — H65.192 OTHER NON-RECURRENT ACUTE NONSUPPURATIVE OTITIS MEDIA OF LEFT EAR: Primary | ICD-10-CM

## 2021-12-08 PROCEDURE — 3078F DIAST BP <80 MM HG: CPT | Performed by: NURSE PRACTITIONER

## 2021-12-08 PROCEDURE — 3074F SYST BP LT 130 MM HG: CPT | Performed by: NURSE PRACTITIONER

## 2021-12-08 PROCEDURE — 3008F BODY MASS INDEX DOCD: CPT | Performed by: NURSE PRACTITIONER

## 2021-12-08 PROCEDURE — 99213 OFFICE O/P EST LOW 20 MIN: CPT | Performed by: NURSE PRACTITIONER

## 2021-12-08 RX ORDER — AMOXICILLIN 875 MG/1
875 TABLET, COATED ORAL 2 TIMES DAILY
Qty: 20 TABLET | Refills: 0 | Status: SHIPPED | OUTPATIENT
Start: 2021-12-08 | End: 2021-12-18

## 2021-12-08 NOTE — PROGRESS NOTES
CHIEF COMPLAINT:   Patient presents with:  Sinus Problem          HPI:   Octaviano Lund is a 48year old female who presents for upper respiratory symptoms for  5 days. Patient reports congestion.   Associated symptoms include sinus pressure, post nasal dri right 3/19/2018   • Internal hemorrhoids    • Varicella without mention of complication     Post Varicella      Past Surgical History:   Procedure Laterality Date   • BACK SURGERY  L4/5    December 2007: [facility], Disc removal   • BENIGN BIOPSY LEF GENERAL: well developed, well nourished, in no apparent distress  SKIN: no rashes,no suspicious lesions  HEAD: atraumatic, normocephalic.   No tenderness on palpation of sinuses  EYES: conjunctiva clear  EARS: Left TM erythematous, + bulging, no retractio Instructions   Covid test sent to lab  Quarantine until negative covid test  OTC for symptoms relief   Rest and fluids  Follow-up if not improving     · It is very important to complete full course of antibiotic.    · Acetaminophen or ibuprofen according to ear infections. However, recent studies have shown that the symptoms of ear infections often go away in a couple of days without antibiotics. Bacteria can become resistant to antibiotics, and the medicine may cause side effects.  For these reasons, your hea below 100°F (37.8°C). Then become as active as is comfortable. Ask your provider if you can take aspirin, acetaminophen, or ibuprofen to control your fever.  Anyone under the age of 24 with a viral illness should not take aspirin because of the increased

## 2021-12-08 NOTE — PATIENT INSTRUCTIONS
Patient Instructions   Covid test sent to lab  Quarantine until negative covid test  OTC for symptoms relief   Rest and fluids  Follow-up if not improving     · It is very important to complete full course of antibiotic.    · Acetaminophen or ibuprofen acco treatment for ear infections. However, recent studies have shown that the symptoms of ear infections often go away in a couple of days without antibiotics. Bacteria can become resistant to antibiotics, and the medicine may cause side effects.  For these judy has fallen below 100°F (37.8°C). Then become as active as is comfortable. Ask your provider if you can take aspirin, acetaminophen, or ibuprofen to control your fever.  Anyone under the age of 24 with a viral illness should not take aspirin because of the

## 2021-12-20 ENCOUNTER — LABORATORY ENCOUNTER (OUTPATIENT)
Dept: LAB | Age: 46
End: 2021-12-20
Attending: INTERNAL MEDICINE
Payer: COMMERCIAL

## 2021-12-20 DIAGNOSIS — Z13.220 SCREENING FOR LIPID DISORDERS: ICD-10-CM

## 2021-12-20 DIAGNOSIS — Z13.0 SCREENING FOR BLOOD DISEASE: ICD-10-CM

## 2021-12-20 DIAGNOSIS — Z00.00 ROUTINE GENERAL MEDICAL EXAMINATION AT A HEALTH CARE FACILITY: ICD-10-CM

## 2021-12-20 DIAGNOSIS — Z13.228 SCREENING FOR METABOLIC DISORDER: ICD-10-CM

## 2021-12-20 DIAGNOSIS — Z13.29 SCREENING FOR THYROID DISORDER: ICD-10-CM

## 2021-12-20 PROCEDURE — 80061 LIPID PANEL: CPT | Performed by: INTERNAL MEDICINE

## 2021-12-20 PROCEDURE — 80050 GENERAL HEALTH PANEL: CPT | Performed by: INTERNAL MEDICINE

## 2021-12-23 ENCOUNTER — OFFICE VISIT (OUTPATIENT)
Dept: INTERNAL MEDICINE CLINIC | Facility: CLINIC | Age: 46
End: 2021-12-23
Payer: COMMERCIAL

## 2021-12-23 VITALS
WEIGHT: 144 LBS | OXYGEN SATURATION: 99 % | TEMPERATURE: 98 F | RESPIRATION RATE: 18 BRPM | BODY MASS INDEX: 26.5 KG/M2 | HEIGHT: 62 IN | DIASTOLIC BLOOD PRESSURE: 54 MMHG | HEART RATE: 59 BPM | SYSTOLIC BLOOD PRESSURE: 108 MMHG

## 2021-12-23 DIAGNOSIS — E07.89 THYROID FULLNESS: ICD-10-CM

## 2021-12-23 DIAGNOSIS — R79.89 ELEVATED PLATELET COUNT: ICD-10-CM

## 2021-12-23 DIAGNOSIS — Z00.00 ROUTINE GENERAL MEDICAL EXAMINATION AT A HEALTH CARE FACILITY: Primary | ICD-10-CM

## 2021-12-23 DIAGNOSIS — Z12.11 SCREEN FOR COLON CANCER: ICD-10-CM

## 2021-12-23 PROCEDURE — 3078F DIAST BP <80 MM HG: CPT | Performed by: INTERNAL MEDICINE

## 2021-12-23 PROCEDURE — 3008F BODY MASS INDEX DOCD: CPT | Performed by: INTERNAL MEDICINE

## 2021-12-23 PROCEDURE — 99396 PREV VISIT EST AGE 40-64: CPT | Performed by: INTERNAL MEDICINE

## 2021-12-23 PROCEDURE — 3074F SYST BP LT 130 MM HG: CPT | Performed by: INTERNAL MEDICINE

## 2021-12-23 NOTE — PROGRESS NOTES
Patient presents with:  Physical: ES rm - 4 Physical      HPI:    Patient here for cpe without gyne exam  Sees anthony Friedman on pap and mammogram.  Migraines are much improved on current medications from Dr. Hollis Jeans for skin darkening on neck and Internal derangement of knee, right 3/19/2018   • Internal hemorrhoids    • Varicella without mention of complication     Post Varicella     Past Surgical History:   Procedure Laterality Date   • BACK SURGERY  L4/5    December 2007: [facility], Disc repeat once after 2 hours- ONLY 2 IN 24 HOUR PERIOD MAX. This is a 30 day supply. 36 tablet 3   • amitriptyline 10 MG Oral Tab Take two tablets (20mg) QHS  tablet 3   • Fexofenadine HCl (ALLEGRA OR) daily.  Unsure of dose     • Fluticasone Propionate hernias. Musculoskeletal: Normal range of motion. No edema and no tenderness. Neurological:  No cranial nerve deficit or sensory deficit. Normal muscle tone. Coordination normal.   Skin: Skin is warm and dry. No rash noted. No erythema. No pallor.    Ps

## 2021-12-29 ENCOUNTER — HOSPITAL ENCOUNTER (OUTPATIENT)
Dept: ULTRASOUND IMAGING | Age: 46
Discharge: HOME | End: 2021-12-29
Attending: INTERNAL MEDICINE
Payer: COMMERCIAL

## 2021-12-29 DIAGNOSIS — E07.89 THYROID FULLNESS: ICD-10-CM

## 2021-12-29 PROCEDURE — 76536 US EXAM OF HEAD AND NECK: CPT | Performed by: INTERNAL MEDICINE

## 2021-12-30 ENCOUNTER — LABORATORY ENCOUNTER (OUTPATIENT)
Dept: LAB | Age: 46
End: 2021-12-30
Attending: INTERNAL MEDICINE
Payer: COMMERCIAL

## 2021-12-30 DIAGNOSIS — R79.89 ELEVATED PLATELET COUNT: ICD-10-CM

## 2021-12-30 LAB
BASOPHILS # BLD AUTO: 0.05 X10(3) UL (ref 0–0.2)
BASOPHILS NFR BLD AUTO: 0.7 %
EOSINOPHIL # BLD AUTO: 0.14 X10(3) UL (ref 0–0.7)
EOSINOPHIL NFR BLD AUTO: 1.9 %
ERYTHROCYTE [DISTWIDTH] IN BLOOD BY AUTOMATED COUNT: 13.2 %
HCT VFR BLD AUTO: 37.9 %
HGB BLD-MCNC: 12.7 G/DL
IMM GRANULOCYTES # BLD AUTO: 0.03 X10(3) UL (ref 0–1)
IMM GRANULOCYTES NFR BLD: 0.4 %
LYMPHOCYTES # BLD AUTO: 1.89 X10(3) UL (ref 1–4)
LYMPHOCYTES NFR BLD AUTO: 25.2 %
MCH RBC QN AUTO: 30 PG (ref 26–34)
MCHC RBC AUTO-ENTMCNC: 33.5 G/DL (ref 31–37)
MCV RBC AUTO: 89.6 FL
MONOCYTES # BLD AUTO: 0.59 X10(3) UL (ref 0.1–1)
MONOCYTES NFR BLD AUTO: 7.9 %
NEUTROPHILS # BLD AUTO: 4.81 X10 (3) UL (ref 1.5–7.7)
NEUTROPHILS # BLD AUTO: 4.81 X10(3) UL (ref 1.5–7.7)
NEUTROPHILS NFR BLD AUTO: 63.9 %
PLATELET # BLD AUTO: 362 10(3)UL (ref 150–450)
RBC # BLD AUTO: 4.23 X10(6)UL
WBC # BLD AUTO: 7.5 X10(3) UL (ref 4–11)

## 2021-12-30 PROCEDURE — 85025 COMPLETE CBC W/AUTO DIFF WBC: CPT | Performed by: INTERNAL MEDICINE

## 2022-03-29 ENCOUNTER — MED REC SCAN ONLY (OUTPATIENT)
Dept: INTERNAL MEDICINE CLINIC | Facility: CLINIC | Age: 47
End: 2022-03-29

## 2022-05-30 DIAGNOSIS — G43.001 MIGRAINE WITHOUT AURA AND WITH STATUS MIGRAINOSUS, NOT INTRACTABLE: ICD-10-CM

## 2022-05-31 DIAGNOSIS — G43.001 MIGRAINE WITHOUT AURA AND WITH STATUS MIGRAINOSUS, NOT INTRACTABLE: ICD-10-CM

## 2022-06-02 RX ORDER — AMITRIPTYLINE HYDROCHLORIDE 10 MG/1
TABLET, FILM COATED ORAL
Qty: 60 TABLET | Refills: 0 | OUTPATIENT
Start: 2022-06-02

## 2022-11-21 ENCOUNTER — TELEPHONE (OUTPATIENT)
Dept: NEUROLOGY | Facility: CLINIC | Age: 47
End: 2022-11-21

## 2022-11-21 NOTE — TELEPHONE ENCOUNTER
Former pt of Dr.Li Uziel York, appt needed to be scheduled with alternate provider for refill.   Sent via [address]

## 2022-12-05 ENCOUNTER — TELEPHONE (OUTPATIENT)
Dept: INTERNAL MEDICINE CLINIC | Facility: CLINIC | Age: 47
End: 2022-12-05

## 2022-12-05 ENCOUNTER — OFFICE VISIT (OUTPATIENT)
Dept: NEUROLOGY | Facility: CLINIC | Age: 47
End: 2022-12-05
Payer: COMMERCIAL

## 2022-12-05 VITALS
WEIGHT: 149 LBS | BODY MASS INDEX: 27.42 KG/M2 | HEART RATE: 73 BPM | HEIGHT: 62 IN | RESPIRATION RATE: 16 BRPM | DIASTOLIC BLOOD PRESSURE: 64 MMHG | SYSTOLIC BLOOD PRESSURE: 102 MMHG

## 2022-12-05 DIAGNOSIS — Z13.228 SCREENING FOR METABOLIC DISORDER: ICD-10-CM

## 2022-12-05 DIAGNOSIS — Z13.220 SCREENING FOR LIPID DISORDERS: ICD-10-CM

## 2022-12-05 DIAGNOSIS — Z00.00 ROUTINE GENERAL MEDICAL EXAMINATION AT A HEALTH CARE FACILITY: Primary | ICD-10-CM

## 2022-12-05 DIAGNOSIS — G43.001 MIGRAINE WITHOUT AURA AND WITH STATUS MIGRAINOSUS, NOT INTRACTABLE: Primary | ICD-10-CM

## 2022-12-05 DIAGNOSIS — Z13.0 SCREENING FOR BLOOD DISEASE: ICD-10-CM

## 2022-12-05 DIAGNOSIS — Z13.29 SCREENING FOR THYROID DISORDER: ICD-10-CM

## 2022-12-05 PROCEDURE — 99213 OFFICE O/P EST LOW 20 MIN: CPT | Performed by: OTHER

## 2022-12-05 PROCEDURE — 3078F DIAST BP <80 MM HG: CPT | Performed by: OTHER

## 2022-12-05 PROCEDURE — 3074F SYST BP LT 130 MM HG: CPT | Performed by: OTHER

## 2022-12-05 PROCEDURE — 3008F BODY MASS INDEX DOCD: CPT | Performed by: OTHER

## 2022-12-05 RX ORDER — RIZATRIPTAN BENZOATE 10 MG/1
TABLET, ORALLY DISINTEGRATING ORAL
Qty: 36 TABLET | Refills: 3 | Status: SHIPPED | OUTPATIENT
Start: 2022-12-05

## 2022-12-05 RX ORDER — AMITRIPTYLINE HYDROCHLORIDE 10 MG/1
TABLET, FILM COATED ORAL
Qty: 180 TABLET | Refills: 3 | Status: SHIPPED | OUTPATIENT
Start: 2022-12-05

## 2022-12-05 NOTE — TELEPHONE ENCOUNTER
Informed must fast no call back required.  Orders to [facility]    Future Appointments   Date Time Provider Jayshree Pearsoni   2/9/2023 10:20 AM Carlos Carlton MD [facility]

## 2022-12-22 ENCOUNTER — MED REC SCAN ONLY (OUTPATIENT)
Dept: INTERNAL MEDICINE CLINIC | Facility: CLINIC | Age: 47
End: 2022-12-22

## 2023-01-02 ENCOUNTER — OFFICE VISIT (OUTPATIENT)
Dept: FAMILY MEDICINE CLINIC | Facility: CLINIC | Age: 48
End: 2023-01-02
Payer: COMMERCIAL

## 2023-01-02 VITALS
TEMPERATURE: 98 F | HEIGHT: 63 IN | SYSTOLIC BLOOD PRESSURE: 110 MMHG | WEIGHT: 144 LBS | BODY MASS INDEX: 25.52 KG/M2 | DIASTOLIC BLOOD PRESSURE: 72 MMHG | HEART RATE: 88 BPM | OXYGEN SATURATION: 99 %

## 2023-01-02 DIAGNOSIS — R06.2 WHEEZING: ICD-10-CM

## 2023-01-02 DIAGNOSIS — J06.9 VIRAL URI WITH COUGH: ICD-10-CM

## 2023-01-02 DIAGNOSIS — H66.002 NON-RECURRENT ACUTE SUPPURATIVE OTITIS MEDIA OF LEFT EAR WITHOUT SPONTANEOUS RUPTURE OF TYMPANIC MEMBRANE: Primary | ICD-10-CM

## 2023-01-02 PROCEDURE — 3074F SYST BP LT 130 MM HG: CPT | Performed by: NURSE PRACTITIONER

## 2023-01-02 PROCEDURE — 99213 OFFICE O/P EST LOW 20 MIN: CPT | Performed by: NURSE PRACTITIONER

## 2023-01-02 PROCEDURE — 3008F BODY MASS INDEX DOCD: CPT | Performed by: NURSE PRACTITIONER

## 2023-01-02 PROCEDURE — 3078F DIAST BP <80 MM HG: CPT | Performed by: NURSE PRACTITIONER

## 2023-01-02 RX ORDER — ALBUTEROL SULFATE 90 UG/1
2 AEROSOL, METERED RESPIRATORY (INHALATION) EVERY 4 HOURS PRN
Qty: 1 EACH | Refills: 0 | Status: SHIPPED | OUTPATIENT
Start: 2023-01-02

## 2023-01-02 RX ORDER — AMOXICILLIN AND CLAVULANATE POTASSIUM 875; 125 MG/1; MG/1
1 TABLET, FILM COATED ORAL 2 TIMES DAILY
Qty: 20 TABLET | Refills: 0 | Status: SHIPPED | OUTPATIENT
Start: 2023-01-02 | End: 2023-01-12

## 2023-03-29 ENCOUNTER — TELEPHONE (OUTPATIENT)
Dept: INTERNAL MEDICINE CLINIC | Facility: CLINIC | Age: 48
End: 2023-03-29

## 2023-03-29 NOTE — TELEPHONE ENCOUNTER
Received consult from [facility] [address] [place] [address]. Put in TB bin for review.

## 2023-04-06 NOTE — PROGRESS NOTES
[facility] [address]   Neurology; follow up  CLINIC VISIT  10/19/2021    [address] Patient Status:  No patient class for patient encounter    1975 MRN [id number]   Location [address] [place] [address], [address] [place] [address] [address] of other contraceptive measures    • Herniated lumbar disc without myelopathy     L4-L5   • History of molar pregnancy, antepartum     x 2   • Internal derangement of knee, right 3/19/2018   • Internal hemorrhoids    • Varicella without mention of complica rizatriptan 10 MG Oral Tablet Dispersible use at onset; may repeat once after 2 hours- ONLY 2 IN 24 HOUR PERIOD MAX. This is a 30 day supply.  36 tablet 3   • amitriptyline 10 MG Oral Tab Take two tablets (20mg) QHS  tablet 3   • Fexofenadine HCl (ALL year old female in no acute distress. appearance: Normal developed and well nourished , in no acute stress;   HEENT:  Normal conjunctiva, no abnormal secretion, normal structure of skull, no tenderness  Neck supple,  No carotid bruit,  thyroid normal  Lung Relevant Medications    rizatriptan 10 MG Oral Tablet Dispersible    amitriptyline 10 MG Oral Tab      Other Visit Diagnoses     Need for vaccination    -  Primary    Relevant Orders    FLULAVAL INFLUENZA VACCINE QUAD PRESERVATIVE FREE 0.5 ML (Completed N/A

## 2023-04-15 ENCOUNTER — LAB ENCOUNTER (OUTPATIENT)
Dept: LAB | Age: 48
End: 2023-04-15
Attending: INTERNAL MEDICINE
Payer: COMMERCIAL

## 2023-04-15 DIAGNOSIS — Z13.220 SCREENING FOR LIPID DISORDERS: ICD-10-CM

## 2023-04-15 DIAGNOSIS — Z13.29 SCREENING FOR THYROID DISORDER: ICD-10-CM

## 2023-04-15 DIAGNOSIS — Z13.228 SCREENING FOR METABOLIC DISORDER: ICD-10-CM

## 2023-04-15 DIAGNOSIS — Z13.0 SCREENING FOR BLOOD DISEASE: ICD-10-CM

## 2023-04-15 DIAGNOSIS — Z00.00 ROUTINE GENERAL MEDICAL EXAMINATION AT A HEALTH CARE FACILITY: ICD-10-CM

## 2023-04-15 LAB
ALBUMIN SERPL-MCNC: 3.7 G/DL (ref 3.4–5)
ALBUMIN/GLOB SERPL: 0.8 {RATIO} (ref 1–2)
ALP LIVER SERPL-CCNC: 71 U/L
ALT SERPL-CCNC: 33 U/L
ANION GAP SERPL CALC-SCNC: 3 MMOL/L (ref 0–18)
AST SERPL-CCNC: 25 U/L (ref 15–37)
BASOPHILS # BLD AUTO: 0.07 X10(3) UL (ref 0–0.2)
BASOPHILS NFR BLD AUTO: 0.9 %
BILIRUB SERPL-MCNC: 0.4 MG/DL (ref 0.1–2)
BUN BLD-MCNC: 12 MG/DL (ref 7–18)
CALCIUM BLD-MCNC: 9.7 MG/DL (ref 8.5–10.1)
CHLORIDE SERPL-SCNC: 104 MMOL/L (ref 98–112)
CHOLEST SERPL-MCNC: 191 MG/DL (ref ?–200)
CO2 SERPL-SCNC: 28 MMOL/L (ref 21–32)
CREAT BLD-MCNC: 0.82 MG/DL
EOSINOPHIL # BLD AUTO: 0.09 X10(3) UL (ref 0–0.7)
EOSINOPHIL NFR BLD AUTO: 1.1 %
ERYTHROCYTE [DISTWIDTH] IN BLOOD BY AUTOMATED COUNT: 13.1 %
FASTING PATIENT LIPID ANSWER: YES
FASTING STATUS PATIENT QL REPORTED: YES
GFR SERPLBLD BASED ON 1.73 SQ M-ARVRAT: 88 ML/MIN/1.73M2 (ref 60–?)
GLOBULIN PLAS-MCNC: 4.4 G/DL (ref 2.8–4.4)
GLUCOSE BLD-MCNC: 93 MG/DL (ref 70–99)
HCT VFR BLD AUTO: 40.4 %
HDLC SERPL-MCNC: 58 MG/DL (ref 40–59)
HGB BLD-MCNC: 13.5 G/DL
IMM GRANULOCYTES # BLD AUTO: 0.04 X10(3) UL (ref 0–1)
IMM GRANULOCYTES NFR BLD: 0.5 %
LDLC SERPL CALC-MCNC: 105 MG/DL (ref ?–100)
LYMPHOCYTES # BLD AUTO: 1.82 X10(3) UL (ref 1–4)
LYMPHOCYTES NFR BLD AUTO: 22.7 %
MCH RBC QN AUTO: 29.7 PG (ref 26–34)
MCHC RBC AUTO-ENTMCNC: 33.4 G/DL (ref 31–37)
MCV RBC AUTO: 88.8 FL
MONOCYTES # BLD AUTO: 0.52 X10(3) UL (ref 0.1–1)
MONOCYTES NFR BLD AUTO: 6.5 %
NEUTROPHILS # BLD AUTO: 5.49 X10 (3) UL (ref 1.5–7.7)
NEUTROPHILS # BLD AUTO: 5.49 X10(3) UL (ref 1.5–7.7)
NEUTROPHILS NFR BLD AUTO: 68.3 %
NONHDLC SERPL-MCNC: 133 MG/DL (ref ?–130)
OSMOLALITY SERPL CALC.SUM OF ELEC: 279 MOSM/KG (ref 275–295)
PLATELET # BLD AUTO: 372 10(3)UL (ref 150–450)
POTASSIUM SERPL-SCNC: 3.9 MMOL/L (ref 3.5–5.1)
PROT SERPL-MCNC: 8.1 G/DL (ref 6.4–8.2)
RBC # BLD AUTO: 4.55 X10(6)UL
SODIUM SERPL-SCNC: 135 MMOL/L (ref 136–145)
TRIGL SERPL-MCNC: 163 MG/DL (ref 30–149)
TSI SER-ACNC: 1.6 MIU/ML (ref 0.36–3.74)
VLDLC SERPL CALC-MCNC: 28 MG/DL (ref 0–30)
WBC # BLD AUTO: 8 X10(3) UL (ref 4–11)

## 2023-04-15 PROCEDURE — 80061 LIPID PANEL: CPT | Performed by: INTERNAL MEDICINE

## 2023-04-15 PROCEDURE — 80050 GENERAL HEALTH PANEL: CPT | Performed by: INTERNAL MEDICINE

## 2023-04-17 ENCOUNTER — OFFICE VISIT (OUTPATIENT)
Dept: INTERNAL MEDICINE CLINIC | Facility: CLINIC | Age: 48
End: 2023-04-17
Payer: COMMERCIAL

## 2023-04-17 VITALS
SYSTOLIC BLOOD PRESSURE: 110 MMHG | TEMPERATURE: 98 F | OXYGEN SATURATION: 97 % | DIASTOLIC BLOOD PRESSURE: 62 MMHG | BODY MASS INDEX: 25.57 KG/M2 | WEIGHT: 142.5 LBS | HEIGHT: 62.48 IN | RESPIRATION RATE: 16 BRPM | HEART RATE: 71 BPM

## 2023-04-17 DIAGNOSIS — E78.5 MILD HYPERLIPIDEMIA: ICD-10-CM

## 2023-04-17 DIAGNOSIS — Z12.11 SCREEN FOR COLON CANCER: ICD-10-CM

## 2023-04-17 DIAGNOSIS — Z23 NEED FOR TDAP VACCINATION: ICD-10-CM

## 2023-04-17 DIAGNOSIS — Z00.00 ROUTINE GENERAL MEDICAL EXAMINATION AT A HEALTH CARE FACILITY: Primary | ICD-10-CM

## 2023-04-17 PROCEDURE — 90715 TDAP VACCINE 7 YRS/> IM: CPT | Performed by: INTERNAL MEDICINE

## 2023-04-17 PROCEDURE — 3074F SYST BP LT 130 MM HG: CPT | Performed by: INTERNAL MEDICINE

## 2023-04-17 PROCEDURE — 99396 PREV VISIT EST AGE 40-64: CPT | Performed by: INTERNAL MEDICINE

## 2023-04-17 PROCEDURE — 3078F DIAST BP <80 MM HG: CPT | Performed by: INTERNAL MEDICINE

## 2023-04-17 PROCEDURE — 90471 IMMUNIZATION ADMIN: CPT | Performed by: INTERNAL MEDICINE

## 2023-04-17 PROCEDURE — 3008F BODY MASS INDEX DOCD: CPT | Performed by: INTERNAL MEDICINE

## 2023-04-17 RX ORDER — TACROLIMUS 1 MG/G
1 OINTMENT TOPICAL 2 TIMES DAILY
COMMUNITY
Start: 2023-03-23

## 2023-04-17 RX ORDER — PIMECROLIMUS 1 %
1 CREAM (GRAM) TOPICAL 2 TIMES DAILY
COMMUNITY
Start: 2023-01-10

## 2023-09-15 ENCOUNTER — OFFICE VISIT (OUTPATIENT)
Dept: FAMILY MEDICINE CLINIC | Facility: CLINIC | Age: 48
End: 2023-09-15
Payer: COMMERCIAL

## 2023-09-15 VITALS
BODY MASS INDEX: 26.5 KG/M2 | TEMPERATURE: 98 F | WEIGHT: 144 LBS | SYSTOLIC BLOOD PRESSURE: 104 MMHG | DIASTOLIC BLOOD PRESSURE: 68 MMHG | HEIGHT: 62 IN | RESPIRATION RATE: 18 BRPM | HEART RATE: 75 BPM | OXYGEN SATURATION: 100 %

## 2023-09-15 DIAGNOSIS — J02.9 SORE THROAT: ICD-10-CM

## 2023-09-15 DIAGNOSIS — Z11.52 ENCOUNTER FOR SCREENING FOR COVID-19: Primary | ICD-10-CM

## 2023-09-15 LAB
CONTROL LINE PRESENT WITH A CLEAR BACKGROUND (YES/NO): YES YES/NO
KIT LOT #: NORMAL NUMERIC

## 2023-09-15 PROCEDURE — 87081 CULTURE SCREEN ONLY: CPT | Performed by: FAMILY MEDICINE

## 2023-09-15 PROCEDURE — 87880 STREP A ASSAY W/OPTIC: CPT | Performed by: FAMILY MEDICINE

## 2023-09-15 PROCEDURE — 3074F SYST BP LT 130 MM HG: CPT | Performed by: FAMILY MEDICINE

## 2023-09-15 PROCEDURE — 87635 SARS-COV-2 COVID-19 AMP PRB: CPT | Performed by: FAMILY MEDICINE

## 2023-09-15 PROCEDURE — 99213 OFFICE O/P EST LOW 20 MIN: CPT | Performed by: FAMILY MEDICINE

## 2023-09-15 PROCEDURE — 3008F BODY MASS INDEX DOCD: CPT | Performed by: FAMILY MEDICINE

## 2023-09-15 PROCEDURE — 3078F DIAST BP <80 MM HG: CPT | Performed by: FAMILY MEDICINE

## 2023-09-16 LAB — SARS-COV-2 RNA RESP QL NAA+PROBE: NOT DETECTED

## 2023-12-06 PROBLEM — Z12.11 SPECIAL SCREENING FOR MALIGNANT NEOPLASM OF COLON: Status: ACTIVE | Noted: 2023-12-06

## 2023-12-12 DIAGNOSIS — G43.001 MIGRAINE WITHOUT AURA AND WITH STATUS MIGRAINOSUS, NOT INTRACTABLE: ICD-10-CM

## 2023-12-12 NOTE — TELEPHONE ENCOUNTER
Medication: AMITRIPTYLINE 10 MG Oral Tab      Date of last refill: 12/05/2022 (#180/3)  [place]     Last office visit: 12/05/2022  Due back to clinic per last office note:  Around 12/05/2023  Date next office visit scheduled:    No future appointments. Last OV note recommendation:    ASSESSMENT/PLAN:   Migraine without aura and with status migrainosus, not intractable  (primary encounter diagnosis)     Patient is here for follow up for migraine headaches  Well controlled on current regime    Continue Amitriptyline 20 mg at night     Continue Rizatriptan prn     Follow up in about 12 months or sooner if needed        See orders and medications filed with this encounter. The patient indicates understanding of these issues and agrees with the plan.         Jennifer Yañez MD  [facility]

## 2023-12-13 RX ORDER — AMITRIPTYLINE HYDROCHLORIDE 10 MG/1
TABLET, FILM COATED ORAL
Qty: 180 TABLET | Refills: 0 | Status: SHIPPED | OUTPATIENT
Start: 2023-12-13

## 2024-02-09 ENCOUNTER — OFFICE VISIT (OUTPATIENT)
Dept: FAMILY MEDICINE CLINIC | Facility: CLINIC | Age: 49
End: 2024-02-09
Payer: COMMERCIAL

## 2024-02-09 VITALS
OXYGEN SATURATION: 98 % | HEART RATE: 86 BPM | RESPIRATION RATE: 16 BRPM | HEIGHT: 62 IN | SYSTOLIC BLOOD PRESSURE: 104 MMHG | BODY MASS INDEX: 27.42 KG/M2 | TEMPERATURE: 99 F | DIASTOLIC BLOOD PRESSURE: 70 MMHG | WEIGHT: 149 LBS

## 2024-02-09 DIAGNOSIS — H65.02 NON-RECURRENT ACUTE SEROUS OTITIS MEDIA OF LEFT EAR: ICD-10-CM

## 2024-02-09 DIAGNOSIS — J06.9 VIRAL UPPER RESPIRATORY TRACT INFECTION: Primary | ICD-10-CM

## 2024-02-09 PROCEDURE — 87635 SARS-COV-2 COVID-19 AMP PRB: CPT | Performed by: PHYSICIAN ASSISTANT

## 2024-02-09 PROCEDURE — 3008F BODY MASS INDEX DOCD: CPT | Performed by: PHYSICIAN ASSISTANT

## 2024-02-09 PROCEDURE — 99213 OFFICE O/P EST LOW 20 MIN: CPT | Performed by: PHYSICIAN ASSISTANT

## 2024-02-09 PROCEDURE — 3078F DIAST BP <80 MM HG: CPT | Performed by: PHYSICIAN ASSISTANT

## 2024-02-09 PROCEDURE — 3074F SYST BP LT 130 MM HG: CPT | Performed by: PHYSICIAN ASSISTANT

## 2024-02-09 RX ORDER — AMOXICILLIN AND CLAVULANATE POTASSIUM 875; 125 MG/1; MG/1
1 TABLET, FILM COATED ORAL 2 TIMES DAILY
Qty: 14 TABLET | Refills: 0 | Status: SHIPPED | OUTPATIENT
Start: 2024-02-09 | End: 2024-02-16

## 2024-02-09 NOTE — PROGRESS NOTES
CHIEF COMPLAINT:     Chief Complaint   Patient presents with    Sore Throat     Sore throat, body aches, chills, headache, post nasal drip, x5 days     Negative home covid-19 test      HPI:   Maria Isabel Milian is a 48 year old female who presents to [facility] for COVID-19 testing with symptoms/history as described below:  Onset: 4-5 days   Exposure to COVID:   no known exposure     Fever:     Yes [x]     No []     Felt feverish throughout the week  Cough:    Yes [x]     No []    Mild, not constant    Dry []     Productive []   Congestion: Yes [x]     No []      Rhinorrhea: Yes [x]     No []      Loss of Smell/Taste: Yes []     No [x]        Fatigue:   Yes [x]     No []     Myalgias: Yes [x]     No []   Chills:       Yes [x]    No []       Headache:     Yes [x]     No []   More mild today, temples    Sore throat:   Yes [x]      No []    Improving compared to the past 2 days   Ear Pain:  Yes []      No [x]    Muffled sensation to L ear    Shortness of breath:  Yes []   No [x]     Wheezing:   Yes []   No [x]     Chest pain/pressure:     Yes []     No [x]        GI symptoms: Yes []     No [x]                     Nausea  []; Vomiting  []; Diarrhea  [] ; Upset stomach [];   Abdominal Pain  []          Additional symptoms: no    Symptoms have been persisting since onset.  Treating symptoms with Dayquil, Nyquil, Aleve     Pt  denies other exposure to illness such as strep or mono.   Any recent travel: No    Other factors/medical conditions that may impact condition: no     Current Outpatient Medications   Medication Sig Dispense Refill    amoxicillin clavulanate 875-125 MG Oral Tab Take 1 tablet by mouth 2 (two) times daily for 7 days. 14 tablet 0    amitriptyline 10 MG Oral Tab TAKE 2 TABLETS(20 MG) BY MOUTH EVERY NIGHT AT BEDTIME 180 tablet 0    tacrolimus 0.1 % External Ointment Apply 1 Application topically 2 (two) times daily.      tretinoin 0.025 % External Cream Apply a small amount to skin as directed 1 pea size amount to  face nightly      rizatriptan 10 MG Oral Tablet Dispersible use at onset; may repeat once after 2 hours- ONLY 2 IN 24 HOUR PERIOD MAX. This is a 30 day supply. 36 tablet 3    Fexofenadine HCl (ALLEGRA OR) daily. Unsure of dose      Fluticasone Propionate 50 MCG/ACT Nasal Suspension 1 spray by Nasal route 2 (two) times daily. 1 Bottle 3      Past Medical History:   Diagnosis Date    Allergic rhinitis     Arthritis     BACK PAIN     Cervical high risk HPV (human papillomavirus) test positive 12/2012    Cholelithiasis     Chronic sinusitis     Decorative tattoo     Eczema     General counseling for initiation of other contraceptive measures     Herniated lumbar disc without myelopathy     L4-L5    History of molar pregnancy, antepartum     x 2    Internal derangement of knee, right 03/19/2018    Internal hemorrhoids     Varicella without mention of complication     Post Varicella    Wears glasses       Past Surgical History:   Procedure Laterality Date    BACK SURGERY  L4/5    December 2007: [facility], Disc removal    BENIGN BIOPSY LEFT Left 1996???    CHOLECYSTECTOMY      June 2006    COLONOSCOPY  5/14/2010    Complete, d/t hematochezia    D & C  1992    also in 1994, 1996, 1997    INJECTION, ANESTHETIC/STEROID, TRANSFORAMINAL EPIDURAL; LUMBAR/SACRAL, ADD'L LEVEL Left 7/17/2014    Procedure: TRANSFORAMINAL EPIDURAL - LUMBAR;  Surgeon: Grover Juarez DO;  Location: [facility]    INJECTION, ANESTHETIC/STEROID, TRANSFORAMINAL EPIDURAL; LUMBAR/SACRAL, SINGLE LEVEL Left 7/17/2014    Procedure: TRANSFORAMINAL EPIDURAL - LUMBAR;  Surgeon: Grover Juarez DO;  Location: [facility]    M-SEDAJ BY SM PHYS PERFRMG SVC 5+ YR Left 7/17/2014    Procedure: TRANSFORAMINAL EPIDURAL - LUMBAR;  Surgeon: Grover uJarez DO;  Location: [facility]    PERFECTO LOCALIZATION WIRE 1 SITE LEFT (CPT=19281)  1996?    neg    OTHER  12/2012    ESSURE    OTHER SURGICAL HISTORY       Esophagoscopy with endoscopic ultrasound    PUNC/ASPIR BREAST CYST  1992    left breast cyst         Social History     Socioeconomic History    Marital status:    Tobacco Use    Smoking status: Never    Smokeless tobacco: Never   Vaping Use    Vaping Use: Never used   Substance and Sexual Activity    Alcohol use: No     Alcohol/week: 0.0 standard drinks of alcohol    Drug use: No    Sexual activity: Yes     Partners: Male     Birth control/protection: Surgical, Pill, OCP     Comment: essure   Other Topics Concern    Caffeine Concern Yes     Comment: soda 2-4 cans per day    Exercise Yes     Comment: 4-5x per week         REVIEW OF SYSTEMS:   GENERAL: ok appetite, drinking fluids  SKIN: Denies rash or other lesions  HEENT: See HPI  LUNGS: See HPI  CARDIOVASCULAR: no palpitations; see HPI  GI: see HPI  NEURO: no dizziness; see HPI    EXAM:   /70   Pulse 86   Temp 99.3 °F (37.4 °C) (Temporal)   Resp 16   Ht 5' 2\" (1.575 m)   Wt 149 lb (67.6 kg)   LMP 08/22/2023 (Approximate)   SpO2 98%   BMI 27.25 kg/m²   GENERAL: Non-toxic, well nourished, in no apparent distress  SKIN: no rashes,no suspicious lesions  HEAD: atraumatic, normocephalic.    EYES: conjunctiva clear  EARS: Right TM pearly, no bulging, no retraction,no fluid, bony landmarks sharp  Left TM mildly injected, slight bulging, no retraction, serous fluid, bony landmarks still visible  NOSE: Nostrils patent, + nasal discharge, nasal mucosa reddened and inflamed   THROAT: Oral mucosa pink, moist. Posterior pharynx is minimally erythematous. No exudates. Uvula midline   NECK: Supple, non-tender  LUNGS: clear to auscultation bilaterally, no wheezes, crackles or rhonchi. Breathing is non labored. Speaking in full sentences without hesitation. No decreased BS  CARDIO: RRR without murmur  LYMPH:  no lymphadenopathy.    ASSESSMENT AND PLAN:   Maria Isabel Milian is a 48 year old female who presents with upper respiratory symptoms that are consistent  with    ASSESSMENT:   Encounter Diagnoses   Name Primary?    Viral upper respiratory tract infection Yes    Non-recurrent acute serous otitis media of left ear        Meds & Refills for this Visit:  Requested Prescriptions     Signed Prescriptions Disp Refills    amoxicillin clavulanate 875-125 MG Oral Tab 14 tablet 0     Sig: Take 1 tablet by mouth 2 (two) times daily for 7 days.       PLAN: COVID-19 testing ordered.  Discussed turnaround time for results with patient. On day 4 of illness currently. Likely viral etiology at this time.   Printed out script for Augmentin. Discussed when to start. Left TM is injected but not full blown AOM. Advised if worsening in the next 24-48 hours then to start abx. Discussed s/s of secondary sinus infection but not at that point yet.  - Supportive care as described in Patient Instructions and as outlined below.  - to follow up with PCP or IC for reevaluation for persistent symptoms.    - Discussed s/s of worsening infection/condition with Patient and importance of prompt medical re-evaluation including when to seek emergency care. Patient voiced understanding      Supportive care:  Increase fluids and rest.   May consider OTC tylenol or ibuprofen as needed and directed on packaging   May consider OTC guaifenesin as needed and directed on packaging to thin mucus secretions.  May consider OTC dextromethorphan as needed and directed on packaging as a cough suppressant   May consider OTC pseudoephedrine as needed and directed on packaging as a nasal decongestant if no contraindications.   May consider OTC saline nasal spray per box instructions    All questions were addressed and answered.     Risks, benefits, and side effects of medication discussed. Patient voiced understanding and agrees with today's plan.        There are no Patient Instructions on file for this visit.

## 2024-02-10 LAB — SARS-COV-2 RNA RESP QL NAA+PROBE: DETECTED

## 2024-02-27 ENCOUNTER — TELEPHONE (OUTPATIENT)
Dept: ORTHOPEDICS CLINIC | Facility: CLINIC | Age: 49
End: 2024-02-27

## 2024-02-27 DIAGNOSIS — M25.552 LEFT HIP PAIN: Primary | ICD-10-CM

## 2024-02-27 NOTE — TELEPHONE ENCOUNTER
X-Ray ordered.   Called Patient and notified patient to come in early.  She states she will.   Future Appointments   Date Time Provider Department Center   3/6/2024 11:25 AM [facility] X[facility] [place]   3/6/2024 11:40 AM Jamil Saba PA-C [facility]

## 2024-03-06 ENCOUNTER — OFFICE VISIT (OUTPATIENT)
Dept: ORTHOPEDICS CLINIC | Facility: CLINIC | Age: 49
End: 2024-03-06
Payer: COMMERCIAL

## 2024-03-06 ENCOUNTER — HOSPITAL ENCOUNTER (OUTPATIENT)
Dept: GENERAL RADIOLOGY | Age: 49
Discharge: HOME | End: 2024-03-06
Attending: PHYSICIAN ASSISTANT
Payer: COMMERCIAL

## 2024-03-06 VITALS — HEIGHT: 62 IN | WEIGHT: 145 LBS | BODY MASS INDEX: 26.68 KG/M2

## 2024-03-06 DIAGNOSIS — M25.552 LEFT HIP PAIN: ICD-10-CM

## 2024-03-06 DIAGNOSIS — M25.552 LEFT HIP PAIN: Primary | ICD-10-CM

## 2024-03-06 DIAGNOSIS — M25.852 LEFT HIP IMPINGEMENT SYNDROME: ICD-10-CM

## 2024-03-06 PROCEDURE — 73502 X-RAY EXAM HIP UNI 2-3 VIEWS: CPT | Performed by: PHYSICIAN ASSISTANT

## 2024-03-06 PROCEDURE — 99213 OFFICE O/P EST LOW 20 MIN: CPT | Performed by: PHYSICIAN ASSISTANT

## 2024-03-06 PROCEDURE — 3008F BODY MASS INDEX DOCD: CPT | Performed by: PHYSICIAN ASSISTANT

## 2024-03-06 RX ORDER — NORETHINDRONE ACETATE AND ETHINYL ESTRADIOL .02; 1 MG/1; MG/1
1 TABLET ORAL DAILY
COMMUNITY

## 2024-03-06 NOTE — H&P
[facility] New Patient Note    CC:   Chief Complaint   Patient presents with    Hip Pain     Left hip pain;   ONSET: 5 months;   Pain Score: 2-8 depending on activity, activity makes it worse       HPI: This 48 year old female presents today with complaints of left hip pain.  Patient reports about a 5-month history of insidious onset left hip pain with focal pain around the anterior lateral aspect of the hip which is described as \"deep\".  She denies any radiation of pain.  Denies any numbness or tingling.  Denies any clicking or popping felt in the left hip.  Pain worsens with activity such as [employer] class and cycling but also worsens after period of prolonged sitting and standing.  She takes Aleve PM which does offer temporary relief.    Past Medical History:   Diagnosis Date    Allergic rhinitis     Arthritis     BACK PAIN     Cervical high risk HPV (human papillomavirus) test positive 12/2012    Cholelithiasis     Chronic sinusitis     Decorative tattoo     Eczema     General counseling for initiation of other contraceptive measures     Herniated lumbar disc without myelopathy     L4-L5    History of molar pregnancy, antepartum (HCC)     x 2    Internal derangement of knee, right 03/19/2018    Internal hemorrhoids     Varicella without mention of complication     Post Varicella    Wears glasses      Past Surgical History:   Procedure Laterality Date    BACK SURGERY  L4/5    December 2007: [facility], Disc removal    BENIGN BIOPSY LEFT Left 1996???    CHOLECYSTECTOMY      June 2006    COLONOSCOPY  5/14/2010    Complete, d/t hematochezia    D & C  1992    also in 1994, 1996, 1997    INJECTION, ANESTHETIC/STEROID, TRANSFORAMINAL EPIDURAL; LUMBAR/SACRAL, ADD'L LEVEL Left 7/17/2014    Procedure: TRANSFORAMINAL EPIDURAL - LUMBAR;  Surgeon: Grover Juarez DO;  Location: [facility] [place] [facility]    INJECTION, ANESTHETIC/STEROID, TRANSFORAMINAL EPIDURAL; LUMBAR/SACRAL, SINGLE LEVEL Left 7/17/2014     Procedure: TRANSFORAMINAL EPIDURAL - LUMBAR;  Surgeon: Grover Juarez DO;  Location: [facility] [place] [facility]    M-SEDAJ BY SM PHYS PERFRMG SVC 5+ YR Left 7/17/2014    Procedure: TRANSFORAMINAL EPIDURAL - LUMBAR;  Surgeon: Grover Juarez DO;  Location: [facility]IN MANA[facility]    PERFECTO LOCALIZATION WIRE 1 SITE LEFT (CPT=19281)  1996?    neg    OTHER  12/2012    ESSURE    OTHER SURGICAL HISTORY      Esophagoscopy with endoscopic ultrasound    PUNC/ASPIR BREAST CYST  1992    left breast cyst     Current Outpatient Medications   Medication Sig Dispense Refill    Norethindrone Acet-Ethinyl Est 1-20 MG-MCG Oral Tab Take 1 tablet by mouth daily.      amitriptyline 10 MG Oral Tab TAKE 2 TABLETS(20 MG) BY MOUTH EVERY NIGHT AT BEDTIME 180 tablet 0    tacrolimus 0.1 % External Ointment Apply 1 Application topically 2 (two) times daily.      tretinoin 0.025 % External Cream Apply a small amount to skin as directed 1 pea size amount to face nightly      rizatriptan 10 MG Oral Tablet Dispersible use at onset; may repeat once after 2 hours- ONLY 2 IN 24 HOUR PERIOD MAX. This is a 30 day supply. 36 tablet 3    Fexofenadine HCl (ALLEGRA OR) daily. Unsure of dose      Fluticasone Propionate 50 MCG/ACT Nasal Suspension 1 spray by Nasal route 2 (two) times daily. 1 Bottle 3     Allergies   Allergen Reactions    Seasonal Runny nose     Family History   Problem Relation Age of Onset    Hypertension Mother     Other (Other) Mother 46        ruptured brain aneurysm    Breast Cancer Paternal Aunt 49        dx age 49     Asthma Daughter      Social History     Occupational History    Not on file   Tobacco Use    Smoking status: Never    Smokeless tobacco: Never   Vaping Use    Vaping Use: Never used   Substance and Sexual Activity    Alcohol use: No     Alcohol/week: 0.0 standard drinks of alcohol    Drug use: No    Sexual activity: Yes     Partners: Male     Birth control/protection: Surgical, Pill, OCP     Comment: essure         ROS:  Comprehensive system review obtained and negative except as mentioned above      Physical Exam:    Ht 5' 2\" (1.575 m)   Wt 145 lb (65.8 kg)   LMP 08/22/2023 (Approximate)   BMI 26.52 kg/m²   Constitutional: Awake, alert, no distress.   Psychological: Appropriate affect.  Respiratory: Unlabored breathing.  Left lower extremity:  Inspection: skin is intact without any redness, deformity, or effusion.   Palpation: No significant discomfort palpation along the gluteal tendon insertions onto the greater trochanter.  There is a mild degree of greater trochanteric bursal tenderness.  Range of motion: Internal rotation of hip to approximately 20 degrees. External rotation of hip to approximately 45 degrees.  No significant groin pain with rotation of the hip.  Stinchfield test negative.  FADIR position does increase and reproduce pain complaint.  No pain with CHIP position..   Neuromuscular: Strength is normal and sensation is intact.  Vascular: Extremities are warm and well-perfused.  Lymph: Unremarkable.      Imaging: Imaging was personally viewed, independently interpreted and radiology report read.  Radiographs of the left hip obtained today demonstrates well-maintained joint spaces without any significant degenerative changes appreciated.  Possible slight subchondral cyst along the lateral femoral head.    Assessment/Plan:  Diagnoses and all orders for this visit:    Left hip pain  -     Pain Management Referral - In Network    Left hip impingement syndrome      Assessment: 48-year-old female with symptoms of the left hip most consistent with impingement syndrome.    Plan: I discussed the condition with the patient in detail as well as nonsurgical treatment options including reducing inflammation through prescription anti-inflammatory or intra-articular left hip injection.  The patient is interested in intra-articular injection for which a referral to Dr. Lamin Thomas was placed.  I also discussed  strengthening exercises for the muscles around the hip and provided the patient with a hip conditioning packet.  We could consider physical therapy in the future if pain persist.  Ultimately, may consider MRI if no improvement with the injection or with physical therapy/exercises.  The patient's questions were sought and answered satisfactorily.  She is happy with the plan and will follow as advised.      Jamil Saba PA-C  [facility]    This note was dictated using Dragon software.  While it was briefly proofread prior to completion, some grammatical, spelling, and word choice errors due to dictation may still occur.

## 2024-04-25 ENCOUNTER — PATIENT MESSAGE (OUTPATIENT)
Dept: INTERNAL MEDICINE CLINIC | Facility: CLINIC | Age: 49
End: 2024-04-25

## 2024-04-26 ENCOUNTER — OFFICE VISIT (OUTPATIENT)
Dept: NEUROLOGY | Facility: CLINIC | Age: 49
End: 2024-04-26
Payer: COMMERCIAL

## 2024-04-26 VITALS
HEART RATE: 77 BPM | SYSTOLIC BLOOD PRESSURE: 100 MMHG | DIASTOLIC BLOOD PRESSURE: 60 MMHG | WEIGHT: 150 LBS | BODY MASS INDEX: 27 KG/M2 | RESPIRATION RATE: 16 BRPM

## 2024-04-26 DIAGNOSIS — G43.001 MIGRAINE WITHOUT AURA AND WITH STATUS MIGRAINOSUS, NOT INTRACTABLE: Primary | ICD-10-CM

## 2024-04-26 PROCEDURE — 3074F SYST BP LT 130 MM HG: CPT | Performed by: OTHER

## 2024-04-26 PROCEDURE — 99213 OFFICE O/P EST LOW 20 MIN: CPT | Performed by: OTHER

## 2024-04-26 PROCEDURE — 3078F DIAST BP <80 MM HG: CPT | Performed by: OTHER

## 2024-04-26 RX ORDER — AMITRIPTYLINE HYDROCHLORIDE 10 MG/1
TABLET, FILM COATED ORAL
Qty: 180 TABLET | Refills: 3 | Status: SHIPPED | OUTPATIENT
Start: 2024-04-26

## 2024-04-26 RX ORDER — RIZATRIPTAN BENZOATE 10 MG/1
TABLET, ORALLY DISINTEGRATING ORAL
Qty: 36 TABLET | Refills: 3 | Status: SHIPPED | OUTPATIENT
Start: 2024-04-26

## 2024-04-26 NOTE — TELEPHONE ENCOUNTER
From: Maria Isabel Milian  To: Farzaneh Aviles  Sent: 4/25/2024 8:23 PM CDT  Subject: Hormonal evaluation    I've struggled with weight gain during the last couple of years. Regular exercise, better eating and a higher protein intake hasn't made an impact. As I get closer to menopause, I'm questioning how that may be affecting my weight gain. Is there a specialist worth seeing, and/or tests I should consider getting? I really want to get this under control before it gets worse

## 2024-04-26 NOTE — PATIENT INSTRUCTIONS
Refill policies:    Allow 2-3 business days for refills; controlled substances may take longer.  Contact your pharmacy at least 5 days prior to running out of medication and have them send an electronic request or submit request through the “request refill” option in your [employer] account.  Refills are not addressed on weekends; covering physicians do not authorize routine medications on weekends.  No narcotics or controlled substances are refilled after noon on Fridays or by on call physicians.  By law, narcotics must be electronically prescribed.  A 30 day supply with no refills is the maximum allowed.  If your prescription is due for a refill, you may be due for a follow up appointment.  To best provide you care, patients receiving routine medications need to be seen at least once a year.  Patients receiving narcotic/controlled substance medications need to be seen at least once every 3 months.  In the event that your preferred pharmacy does not have the requested medication in stock (e.g. Backordered), it is your responsibility to find another pharmacy that has the requested medication available.  We will gladly send a new prescription to that pharmacy at your request.    Scheduling Tests:    If your physician has ordered radiology tests such as MRI or CT scans, please contact Central Scheduling at [phone] right away to schedule the test.  Once scheduled, the [facility] Centralized Referral Team will work with your insurance carrier to obtain pre-certification or prior authorization.  Depending on your insurance carrier, approval may take 3-10 days.  It is highly recommended patients assure they have received an authorization before having a test performed.  If test is done without insurance authorization, patient may be responsible for the entire amount billed.      Precertification and Prior Authorizations:  If your physician has recommended that you have a procedure or additional testing performed the [facility]  Centralized Referral Team will contact your insurance carrier to obtain pre-certification or prior authorization.    You are strongly encouraged to contact your insurance carrier to verify that your procedure/test has been approved and is a COVERED benefit.  Although the [facility] Centralized Referral Team does its due diligence, the insurance carrier gives the disclaimer that \"Although the procedure is authorized, this does not guarantee payment.\"    Ultimately the patient is responsible for payment.   Thank you for your understanding in this matter.  Paperwork Completion:  If you require FMLA or disability paperwork for your recovery, please make sure to either drop it off or have it faxed to our office at [phone]. Be sure the form has your name and date of birth on it.  The form will be faxed to our Forms Department and they will complete it for you.  There is a 25$ fee for all forms that need to be filled out.  Please be aware there is a 10-14 day turnaround time.  You will need to sign a release of information (GURPREET) form if your paperwork does not come with one.  You may call the Forms Department with any questions at [phone].  Their fax number is [phone].

## 2024-04-26 NOTE — TELEPHONE ENCOUNTER
Attempted to call patient, left detailed message to schedule sooner appointment to discuss hormones and weight gain with one of our physician assistants or nurse practitioner.  Names provided.  [employer] message to patient.

## 2024-04-26 NOTE — PROGRESS NOTES
HPI:    Patient ID: Maria Isabel Milian is a 48 year old female.    Neurologic Problem    Migraine          Patient is a pleasant 48  year old female who presents for headaches for follow up. Last seen in Dec 2022. .  She states headaches are under control. States earlier headaches used to be more intense and slightly more frequent mixed migraine and tension. She now gets them infrequently and may be 1 every 3 months and less intense. Amitriptyline and Rizatriptan working well.  No new complaints      HISTORY:  Past Medical History:    Allergic rhinitis    Arthritis    BACK PAIN    Cervical high risk HPV (human papillomavirus) test positive    Cholelithiasis    Chronic sinusitis    Decorative tattoo    Eczema    General counseling for initiation of other contraceptive measures    Herniated lumbar disc without myelopathy    L4-L5    History of molar pregnancy, antepartum (HCC)    x 2    Internal derangement of knee, right    Internal hemorrhoids    Varicella without mention of complication    Post Varicella    Wears glasses      Past Surgical History:   Procedure Laterality Date    Back surgery  L4/5    December 2007: [facility], Disc removal    Benign biopsy left Left 1996???    Cholecystectomy      June 2006    Colonoscopy  5/14/2010    Complete, d/t hematochezia    D & c  1992    also in 1994, 1996, 1997    Injection, anesthetic/steroid, transforaminal epidural; lumbar/sacral, add'l level Left 7/17/2014    Procedure: TRANSFORAMINAL EPIDURAL - LUMBAR;  Surgeon: Grover Juarez DO;  Location: [facility]    Injection, anesthetic/steroid, transforaminal epidural; lumbar/sacral, single level Left 7/17/2014    Procedure: TRANSFORAMINAL EPIDURAL - LUMBAR;  Surgeon: Grover Juarez DO;  Location: [facility]    M-sedaj by  phys perfrmg svc 5+ yr Left 7/17/2014    Procedure: TRANSFORAMINAL EPIDURAL - LUMBAR;  Surgeon: Grover Juarez DO;  Location: [facility] FOR [facility]  [facility]NA[facility]    Duyen localization wire 1 site left (cpt=19281)  1996?    neg    Other  12/2012    ESSURE    Other surgical history      Esophagoscopy with endoscopic ultrasound    Punc/aspir breast cyst  1992    left breast cyst      Family History   Problem Relation Age of Onset    Hypertension Mother     Other (Other) Mother 46        ruptured brain aneurysm    Breast Cancer Paternal Aunt 49        dx age 49     Asthma Daughter       Social History     Socioeconomic History    Marital status:    Tobacco Use    Smoking status: Never    Smokeless tobacco: Never   Vaping Use    Vaping status: Never Used   Substance and Sexual Activity    Alcohol use: No     Alcohol/week: 0.0 standard drinks of alcohol    Drug use: No    Sexual activity: Yes     Partners: Male     Birth control/protection: Surgical, Pill, OCP     Comment: essure   Other Topics Concern    Caffeine Concern Yes     Comment: soda  diet 2-4 cans per day    Exercise Yes     Comment: 3-4 x per week        Review of Systems   Constitutional: Negative.    HENT: Negative.     Eyes: Negative.    Respiratory: Negative.     Cardiovascular: Negative.    Gastrointestinal: Negative.    Endocrine: Negative.    Genitourinary: Negative.    Musculoskeletal: Negative.    Allergic/Immunologic: Negative.    Neurological: Negative.    Hematological: Negative.    Psychiatric/Behavioral: Negative.     All other systems reviewed and are negative.           Current Outpatient Medications   Medication Sig Dispense Refill    amitriptyline 10 MG Oral Tab TAKE 2 TABLETS(20 MG) BY MOUTH EVERY NIGHT AT BEDTIME 180 tablet 3    rizatriptan 10 MG Oral Tablet Dispersible use at onset; may repeat once after 2 hours- ONLY 2 IN 24 HOUR PERIOD MAX. This is a 30 day supply. 36 tablet 3    Norethindrone Acet-Ethinyl Est 1-20 MG-MCG Oral Tab Take 1 tablet by mouth daily.      tacrolimus 0.1 % External Ointment Apply 1 Application topically 2 (two) times daily.      tretinoin 0.025 % External  Cream Apply a small amount to skin as directed 1 pea size amount to face nightly      Fexofenadine HCl (ALLEGRA OR) daily. Unsure of dose      Fluticasone Propionate 50 MCG/ACT Nasal Suspension 1 spray by Nasal route 2 (two) times daily. 1 Bottle 3     Allergies:  Allergies   Allergen Reactions    Seasonal Runny nose     PHYSICAL EXAM:   Physical Exam  Blood pressure 100/60, pulse 77, resp. rate 16, weight 150 lb (68 kg), last menstrual period 08/22/2023, not currently breastfeeding.      General Appearance: Well nourished, well developed, no apparent distress.   HEENT: Normocephalic and atraumatic. Normal sclera.  Neck: Normal range of motion. Neck supple.  Cardiovascular: Normal rate, regular rhythm and normal heart sounds.    Pulmonary/Chest: Effort normal and breath sounds normal.   Abdominal: Soft. Bowel sounds are normal.     Neurological: Patient is awake, alert and oriented to person, place and time   Normal memory, attention/concentration, speech and language.    Cranial Nerves: II: Visual acuity: normal  II: Visual fields: normal  III: Pupils: equal, round, reactive to light  III,IV,VI: Extra Ocular Movements: intact  V: Facial sensation: intact  VII: Facial strength: intact  VIII: Hearing: intact  IX: Palate: intact  XI: Shoulder shrug: intact  XII: Tongue movement: normal    Motor: Normal tone. Strength is  5 out of 5 in all extremities bilaterally.    Sensory: Sensory examination is normal to light touch and pinprick     Coordination: Finger-to-nose test normal bilaterally without evidence of dysmetria.    Gait: normal casual gait       ASSESSMENT/PLAN:     Encounter Diagnosis   Name Primary?    Migraine without aura and with status migrainosus, not intractable Yes       Patient presents for follow up for migraine headaches  Well controlled on current regime    Continue Amitriptyline 20 mg at night    Continue Rizatriptan prn    Follow up in about 12 months or sooner if needed      See orders and  medications filed with this encounter. The patient indicates understanding of these issues and agrees with the plan.      Salina Dumont MD  [facility]      Meds This Visit:     ID#[id number]

## 2024-05-06 ENCOUNTER — OFFICE VISIT (OUTPATIENT)
Dept: INTERNAL MEDICINE CLINIC | Facility: CLINIC | Age: 49
End: 2024-05-06
Payer: COMMERCIAL

## 2024-05-06 VITALS
TEMPERATURE: 98 F | DIASTOLIC BLOOD PRESSURE: 64 MMHG | RESPIRATION RATE: 18 BRPM | SYSTOLIC BLOOD PRESSURE: 106 MMHG | HEIGHT: 62 IN | BODY MASS INDEX: 26.13 KG/M2 | WEIGHT: 142 LBS | OXYGEN SATURATION: 99 % | HEART RATE: 80 BPM

## 2024-05-06 DIAGNOSIS — Z13.0 SCREENING FOR DISORDER OF BLOOD AND BLOOD-FORMING ORGANS: ICD-10-CM

## 2024-05-06 DIAGNOSIS — Z13.220 SCREENING FOR LIPID DISORDERS: ICD-10-CM

## 2024-05-06 DIAGNOSIS — N95.1 PERI-MENOPAUSAL: ICD-10-CM

## 2024-05-06 DIAGNOSIS — R63.5 WEIGHT GAIN: Primary | ICD-10-CM

## 2024-05-06 DIAGNOSIS — Z13.29 SCREENING FOR THYROID DISORDER: ICD-10-CM

## 2024-05-06 DIAGNOSIS — Z13.21 SCREENING FOR ENDOCRINE, NUTRITIONAL, METABOLIC AND IMMUNITY DISORDER: ICD-10-CM

## 2024-05-06 DIAGNOSIS — Z13.0 SCREENING FOR ENDOCRINE, NUTRITIONAL, METABOLIC AND IMMUNITY DISORDER: ICD-10-CM

## 2024-05-06 DIAGNOSIS — Z13.29 SCREENING FOR ENDOCRINE, NUTRITIONAL, METABOLIC AND IMMUNITY DISORDER: ICD-10-CM

## 2024-05-06 DIAGNOSIS — Z13.228 SCREENING FOR ENDOCRINE, NUTRITIONAL, METABOLIC AND IMMUNITY DISORDER: ICD-10-CM

## 2024-05-06 PROCEDURE — 3074F SYST BP LT 130 MM HG: CPT | Performed by: INTERNAL MEDICINE

## 2024-05-06 PROCEDURE — 99213 OFFICE O/P EST LOW 20 MIN: CPT | Performed by: INTERNAL MEDICINE

## 2024-05-06 PROCEDURE — 3078F DIAST BP <80 MM HG: CPT | Performed by: INTERNAL MEDICINE

## 2024-05-06 PROCEDURE — 3008F BODY MASS INDEX DOCD: CPT | Performed by: INTERNAL MEDICINE

## 2024-05-06 NOTE — PROGRESS NOTES
Maria Isabel Milian is a 48 year old female.    Chief Complaint   Patient presents with    Weight Problem     EJ RM 4- Pt is here is for weight gain and hormone problems for the past couple of years       HPI:     Patient here for follow up. She is c/o gaining weight, 15 pounds since 2020 despite exercising regularly. Diet is ok, not always perfect but she tries.  She still has periods monthly but sometimes spotting and some months very heavy. +night sweats/hot flash off and on for few years  She is wondering about checking hormones, xochitl menopause vs menopause, and what she can do to stop the weight gain      Patient Active Problem List   Diagnosis    Herniated nucleus pulposus    Lumbar post-laminectomy syndrome    Lumbar disc herniation with radiculopathy    Allergy to other foods    Effusion of knee joint right    Patellofemoral joint pain, right    Internal derangement of knee, right    Migraine without aura, not intractable    Tension type headache    Poor sleep    Special screening for malignant neoplasm of colon     Current Outpatient Medications   Medication Sig Dispense Refill    amitriptyline 10 MG Oral Tab TAKE 2 TABLETS(20 MG) BY MOUTH EVERY NIGHT AT BEDTIME 180 tablet 3    rizatriptan 10 MG Oral Tablet Dispersible use at onset; may repeat once after 2 hours- ONLY 2 IN 24 HOUR PERIOD MAX. This is a 30 day supply. 36 tablet 3    Norethindrone Acet-Ethinyl Est 1-20 MG-MCG Oral Tab Take 1 tablet by mouth daily.      tacrolimus 0.1 % External Ointment Apply 1 Application topically 2 (two) times daily.      tretinoin 0.025 % External Cream Apply a small amount to skin as directed 1 pea size amount to face nightly      Fexofenadine HCl (ALLEGRA OR) daily. Unsure of dose      Fluticasone Propionate 50 MCG/ACT Nasal Suspension 1 spray by Nasal route 2 (two) times daily. 1 Bottle 3      Past Medical History:    Allergic rhinitis    Arthritis    BACK PAIN    Cervical high risk HPV (human papillomavirus) test positive     Cholelithiasis    Chronic sinusitis    Decorative tattoo    Eczema    General counseling for initiation of other contraceptive measures    Herniated lumbar disc without myelopathy    L4-L5    History of molar pregnancy, antepartum (HCC)    x 2    Internal derangement of knee, right    Internal hemorrhoids    Varicella without mention of complication    Post Varicella    Wears glasses      Social History:  Social History     Socioeconomic History    Marital status:    Tobacco Use    Smoking status: Never    Smokeless tobacco: Never   Vaping Use    Vaping status: Never Used   Substance and Sexual Activity    Alcohol use: No     Alcohol/week: 0.0 standard drinks of alcohol    Drug use: No    Sexual activity: Yes     Partners: Male     Birth control/protection: Surgical, Pill, OCP     Comment: essure   Other Topics Concern    Caffeine Concern Yes     Comment: soda  diet 2-4 cans per day    Exercise Yes     Comment: 3-4 x per week     Family History   Problem Relation Age of Onset    Hypertension Mother     Other (Other) Mother 46        ruptured brain aneurysm    Breast Cancer Paternal Aunt 49        dx age 49     Asthma Daughter         Allergies  Allergies   Allergen Reactions    Seasonal Runny nose         REVIEW OF SYSTEMS:   GENERAL HEALTH:  no fevers +weight gain  RESPIRATORY: no cough  CARDIOVASCULAR: denies chest pain  GI: denies abdominal pain  : no dysuria  HEME: No adenopathy      EXAM:   /64   Pulse 80   Temp 97.6 °F (36.4 °C) (Temporal)   Resp 18   Ht 5' 2\" (1.575 m)   Wt 142 lb (64.4 kg)   LMP 04/30/2024 (Approximate)   SpO2 99%   BMI 25.97 kg/m²   GENERAL: well developed, well nourished,in no apparent distress  HEENT: atraumatic, normocephalic  NECK: supple,no adenopathy, no TM  LUNGS: normal rate without respiratory distress  NEURO: Alert and oriented    ASSESSMENT AND PLAN:     Encounter Diagnoses   Name     Weight gain- 15 pounds over 4 years, likely related to perimenopause  but also may be r/t thyroid dysfunction as well. Will check TSH. Discussed continuing exercise and healthy balanced diet     Kaity-menopausal- check FSH, LH. She sees gyne, encouraged to discuss HRT for symptoms.      Screening for thyroid disorder     Screening for endocrine, nutritional, metabolic and immunity disorder     Screening for disorder of blood and blood-forming organs     Screening for lipid disorders        Orders Placed This Encounter   Procedures    FSH    LH (Luteinizing Hormone)    TSH W Reflex To Free T4 [E]    CBC W Differential W Platelet [E]    Comp Metabolic Panel (14)    Lipid Panel [E]       Meds & Refills for this Visit:  Requested Prescriptions      No prescriptions requested or ordered in this encounter       Imaging & Consults:  None    Return in 8 weeks (on 7/2/2024), or if symptoms worsen or fail to improve, for physical.  There are no Patient Instructions on file for this visit.      The patient indicates understanding of these issues and agrees to the plan.

## 2024-05-09 ENCOUNTER — LAB ENCOUNTER (OUTPATIENT)
Dept: LAB | Age: 49
End: 2024-05-09
Attending: INTERNAL MEDICINE
Payer: COMMERCIAL

## 2024-05-09 DIAGNOSIS — Z13.29 SCREENING FOR ENDOCRINE, NUTRITIONAL, METABOLIC AND IMMUNITY DISORDER: ICD-10-CM

## 2024-05-09 DIAGNOSIS — N95.1 PERI-MENOPAUSAL: ICD-10-CM

## 2024-05-09 DIAGNOSIS — Z13.228 SCREENING FOR ENDOCRINE, NUTRITIONAL, METABOLIC AND IMMUNITY DISORDER: ICD-10-CM

## 2024-05-09 DIAGNOSIS — Z13.0 SCREENING FOR DISORDER OF BLOOD AND BLOOD-FORMING ORGANS: ICD-10-CM

## 2024-05-09 DIAGNOSIS — Z13.220 SCREENING FOR LIPID DISORDERS: ICD-10-CM

## 2024-05-09 DIAGNOSIS — Z13.0 SCREENING FOR ENDOCRINE, NUTRITIONAL, METABOLIC AND IMMUNITY DISORDER: ICD-10-CM

## 2024-05-09 DIAGNOSIS — Z13.21 SCREENING FOR ENDOCRINE, NUTRITIONAL, METABOLIC AND IMMUNITY DISORDER: ICD-10-CM

## 2024-05-09 DIAGNOSIS — Z13.29 SCREENING FOR THYROID DISORDER: ICD-10-CM

## 2024-05-09 LAB
ALBUMIN SERPL-MCNC: 3.8 G/DL (ref 3.4–5)
ALBUMIN/GLOB SERPL: 0.9 {RATIO} (ref 1–2)
ALP LIVER SERPL-CCNC: 80 U/L
ALT SERPL-CCNC: 24 U/L
ANION GAP SERPL CALC-SCNC: 5 MMOL/L (ref 0–18)
AST SERPL-CCNC: 17 U/L (ref 15–37)
BASOPHILS # BLD AUTO: 0.05 X10(3) UL (ref 0–0.2)
BASOPHILS NFR BLD AUTO: 0.5 %
BILIRUB SERPL-MCNC: 0.5 MG/DL (ref 0.1–2)
BUN BLD-MCNC: 14 MG/DL (ref 9–23)
CALCIUM BLD-MCNC: 9.4 MG/DL (ref 8.5–10.1)
CHLORIDE SERPL-SCNC: 108 MMOL/L (ref 98–112)
CHOLEST SERPL-MCNC: 171 MG/DL (ref ?–200)
CO2 SERPL-SCNC: 27 MMOL/L (ref 21–32)
CREAT BLD-MCNC: 0.9 MG/DL
EGFRCR SERPLBLD CKD-EPI 2021: 78 ML/MIN/1.73M2 (ref 60–?)
EOSINOPHIL # BLD AUTO: 0.09 X10(3) UL (ref 0–0.7)
EOSINOPHIL NFR BLD AUTO: 0.9 %
ERYTHROCYTE [DISTWIDTH] IN BLOOD BY AUTOMATED COUNT: 13.2 %
FASTING PATIENT LIPID ANSWER: YES
FASTING STATUS PATIENT QL REPORTED: YES
FSH SERPL-ACNC: 4 MIU/ML
GLOBULIN PLAS-MCNC: 4.4 G/DL (ref 2.8–4.4)
GLUCOSE BLD-MCNC: 93 MG/DL (ref 70–99)
HCT VFR BLD AUTO: 42.2 %
HDLC SERPL-MCNC: 55 MG/DL (ref 40–59)
HGB BLD-MCNC: 13.6 G/DL
IMM GRANULOCYTES # BLD AUTO: 0.06 X10(3) UL (ref 0–1)
IMM GRANULOCYTES NFR BLD: 0.6 %
LDLC SERPL CALC-MCNC: 89 MG/DL (ref ?–100)
LH SERPL-ACNC: 3.9 MIU/ML
LYMPHOCYTES # BLD AUTO: 1.87 X10(3) UL (ref 1–4)
LYMPHOCYTES NFR BLD AUTO: 19.4 %
MCH RBC QN AUTO: 29.5 PG (ref 26–34)
MCHC RBC AUTO-ENTMCNC: 32.2 G/DL (ref 31–37)
MCV RBC AUTO: 91.5 FL
MONOCYTES # BLD AUTO: 0.64 X10(3) UL (ref 0.1–1)
MONOCYTES NFR BLD AUTO: 6.6 %
NEUTROPHILS # BLD AUTO: 6.93 X10 (3) UL (ref 1.5–7.7)
NEUTROPHILS # BLD AUTO: 6.93 X10(3) UL (ref 1.5–7.7)
NEUTROPHILS NFR BLD AUTO: 72 %
NONHDLC SERPL-MCNC: 116 MG/DL (ref ?–130)
OSMOLALITY SERPL CALC.SUM OF ELEC: 290 MOSM/KG (ref 275–295)
PLATELET # BLD AUTO: 350 10(3)UL (ref 150–450)
POTASSIUM SERPL-SCNC: 3.8 MMOL/L (ref 3.5–5.1)
PROT SERPL-MCNC: 8.2 G/DL (ref 6.4–8.2)
RBC # BLD AUTO: 4.61 X10(6)UL
SODIUM SERPL-SCNC: 140 MMOL/L (ref 136–145)
TRIGL SERPL-MCNC: 156 MG/DL (ref 30–149)
TSI SER-ACNC: 1.66 MIU/ML (ref 0.36–3.74)
VLDLC SERPL CALC-MCNC: 25 MG/DL (ref 0–30)
WBC # BLD AUTO: 9.6 X10(3) UL (ref 4–11)

## 2024-05-09 PROCEDURE — 83001 ASSAY OF GONADOTROPIN (FSH): CPT

## 2024-05-09 PROCEDURE — 83002 ASSAY OF GONADOTROPIN (LH): CPT

## 2024-05-09 PROCEDURE — 80053 COMPREHEN METABOLIC PANEL: CPT

## 2024-05-09 PROCEDURE — 80061 LIPID PANEL: CPT

## 2024-05-09 PROCEDURE — 85025 COMPLETE CBC W/AUTO DIFF WBC: CPT

## 2024-05-09 PROCEDURE — 84443 ASSAY THYROID STIM HORMONE: CPT

## 2024-05-09 PROCEDURE — 36415 COLL VENOUS BLD VENIPUNCTURE: CPT

## 2024-06-28 ENCOUNTER — OFFICE VISIT (OUTPATIENT)
Dept: INTERNAL MEDICINE CLINIC | Facility: CLINIC | Age: 49
End: 2024-06-28

## 2024-06-28 VITALS
WEIGHT: 147 LBS | OXYGEN SATURATION: 100 % | HEIGHT: 62.6 IN | BODY MASS INDEX: 26.38 KG/M2 | SYSTOLIC BLOOD PRESSURE: 104 MMHG | DIASTOLIC BLOOD PRESSURE: 60 MMHG | HEART RATE: 72 BPM | RESPIRATION RATE: 18 BRPM | TEMPERATURE: 97 F

## 2024-06-28 DIAGNOSIS — Z00.00 ROUTINE GENERAL MEDICAL EXAMINATION AT A HEALTH CARE FACILITY: Primary | ICD-10-CM

## 2024-06-28 DIAGNOSIS — Z23 NEED FOR VACCINATION: ICD-10-CM

## 2024-06-28 PROCEDURE — 99396 PREV VISIT EST AGE 40-64: CPT | Performed by: INTERNAL MEDICINE

## 2024-06-28 PROCEDURE — 3008F BODY MASS INDEX DOCD: CPT | Performed by: INTERNAL MEDICINE

## 2024-06-28 PROCEDURE — 3074F SYST BP LT 130 MM HG: CPT | Performed by: INTERNAL MEDICINE

## 2024-06-28 PROCEDURE — 3078F DIAST BP <80 MM HG: CPT | Performed by: INTERNAL MEDICINE

## 2024-06-28 PROCEDURE — 90471 IMMUNIZATION ADMIN: CPT | Performed by: INTERNAL MEDICINE

## 2024-06-28 PROCEDURE — 90750 HZV VACC RECOMBINANT IM: CPT | Performed by: INTERNAL MEDICINE

## 2024-06-28 NOTE — PROGRESS NOTES
Chief Complaint   Patient presents with    Physical     ES rm - 3 - Physical       HPI:    Patient here for CPE  Sees gyne and up to date on pap and mammogram  CPE labs reviewed with her (WNL)  Watches diet and exercises regularly  Utd on cscope, she lexi take first shingrix today       Review of Systems   Constitutional: Negative for fever  HENT: Negative for hearing loss, congestion  Eyes: Negative for pain and visual disturbance.   Respiratory: Negative for cough, chest tightness  Cardiovascular: Negative for chest pain, palpitations    Gastrointestinal: Negative for nausea, vomiting  Genitourinary: Negative for dysuria, hematuria   Skin: Negative for color change and rash.   Neurological: Negative for dizziness, syncope  Hematological: Negative for adenopathy.   Psychiatric/Behavioral: No depression.    Patient Active Problem List   Diagnosis    Herniated nucleus pulposus    Lumbar post-laminectomy syndrome    Lumbar disc herniation with radiculopathy    Allergy to other foods    Effusion of knee joint right    Patellofemoral joint pain, right    Internal derangement of knee, right    Migraine without aura, not intractable    Tension type headache    Poor sleep    Special screening for malignant neoplasm of colon       Past Medical History:    Allergic rhinitis    Arthritis    BACK PAIN    Cervical high risk HPV (human papillomavirus) test positive    Cholelithiasis    Chronic sinusitis    Decorative tattoo    Eczema    General counseling for initiation of other contraceptive measures    Herniated lumbar disc without myelopathy    L4-L5    History of molar pregnancy, antepartum (HCC)    x 2    Internal derangement of knee, right    Internal hemorrhoids    Varicella without mention of complication    Post Varicella    Wears glasses     Past Surgical History:   Procedure Laterality Date    Back surgery  L4/5    December 2007: Alex Avila, Disc removal    Benign biopsy left Left 1996???    Cholecystectomy       June 2006    Colonoscopy  5/14/2010    Complete, d/t hematochezia    D & c  1992    also in 1994, 1996, 1997    Injection, anesthetic/steroid, transforaminal epidural; lumbar/sacral, add'l level Left 7/17/2014    Procedure: TRANSFORAMINAL EPIDURAL - LUMBAR;  Surgeon: Grover Juarez DO;  Location: [facility]    Injection, anesthetic/steroid, transforaminal epidural; lumbar/sacral, single level Left 7/17/2014    Procedure: TRANSFORAMINAL EPIDURAL - LUMBAR;  Surgeon: Grover Juarez DO;  Location: [facility]    M-sedaj by sm phys perfrmg svc 5+ yr Left 7/17/2014    Procedure: TRANSFORAMINAL EPIDURAL - LUMBAR;  Surgeon: Grover Juarez DO;  Location: [facility]    Duyen localization wire 1 site left (cpt=19281)  1996?    neg    Other  12/2012    ESSURE    Other surgical history      Esophagoscopy with endoscopic ultrasound    Punc/aspir breast cyst  1992    left breast cyst     Family History   Problem Relation Age of Onset    Hypertension Mother     Other (Other) Mother 46        ruptured brain aneurysm    Breast Cancer Paternal Aunt 49        dx age 49     Asthma Daughter      Social History     Socioeconomic History    Marital status:    Tobacco Use    Smoking status: Never    Smokeless tobacco: Never   Vaping Use    Vaping status: Never Used   Substance and Sexual Activity    Alcohol use: No     Alcohol/week: 0.0 standard drinks of alcohol    Drug use: No    Sexual activity: Yes     Partners: Male     Birth control/protection: Surgical, Pill, OCP     Comment: essure   Other Topics Concern    Caffeine Concern Yes     Comment: soda  diet 2-4 cans per day    Exercise Yes     Comment: 3-4 x per week       Current Outpatient Medications   Medication Sig Dispense Refill    amitriptyline 10 MG Oral Tab TAKE 2 TABLETS(20 MG) BY MOUTH EVERY NIGHT AT BEDTIME 180 tablet 3    rizatriptan 10 MG Oral Tablet Dispersible use at onset; may repeat once after 2  hours- ONLY 2 IN 24 HOUR PERIOD MAX. This is a 30 day supply. 36 tablet 3    Norethindrone Acet-Ethinyl Est 1-20 MG-MCG Oral Tab Take 1 tablet by mouth daily.      tacrolimus 0.1 % External Ointment Apply 1 Application topically 2 (two) times daily.      tretinoin 0.025 % External Cream Apply a small amount to skin as directed 1 pea size amount to face nightly      Fexofenadine HCl (ALLEGRA OR) daily. Unsure of dose      Fluticasone Propionate 50 MCG/ACT Nasal Suspension 1 spray by Nasal route 2 (two) times daily. 1 Bottle 3       Allergies  Allergies   Allergen Reactions    Seasonal Runny nose       Health Maintenance  Immunizations:  Immunization History   Administered Date(s) Administered    Covid-19 Vaccine Moderna 100 mcg/0.5 ml 03/24/2021, 04/22/2021    Covid-19 Vaccine Pfizer 30 mcg/0.3 ml 12/30/2021    FLULAVAL 6 months & older 0.5 ml Prefilled syringe (07204) 10/19/2021    FLUZONE 6 months and older PFS 0.5 ml (50826) 10/05/2017, 10/19/2021    Flucelvax 0.5ml Vaccine 10/15/2020    HEP B 04/17/2018    HEP B, Adult 04/17/2018    Influenza 10/05/2012, 12/30/2015, 10/06/2017, 10/15/2020    TDAP 10/26/2012, 04/17/2023   Pended Date(s) Pended    Zoster Vaccine Recombinant Adjuvanted (Shingrix) 06/28/2024         Physical Exam  /60 (BP Location: Left arm, Patient Position: Sitting, Cuff Size: adult)   Pulse 72   Temp 96.8 °F (36 °C) (Temporal)   Resp 18   Ht 5' 2.6\" (1.59 m)   Wt 147 lb (66.7 kg)   LMP 04/30/2024 (Approximate)   SpO2 100%   BMI 26.38 kg/m²   Constitutional: Oriented to person, place, and time. No distress.   HEENT:  Normocephalic and atraumatic. Hearing and tympanic membranes normal.   Eyes: Conjunctivae and EOM are normal. PERRLA. No scleral icterus.   Neck: Normal range of motion. Neck supple.   Breasts: no masses or lumps, no LAD  Cardiovascular: Normal rate, regular rhythm and intact distal pulses.    Pulmonary/Chest: Effort normal and breath sounds normal.   Abdominal: Soft.  Bowel sounds are normal. Non tender, ND  Neurological: No cranial nerve deficit or sensory deficit. Normal muscle tone.   Skin: Skin is warm and dry.   Psychiatric: Normal mood and affect.     A/P:    Encounter Diagnoses   Name     Need for vaccination     Routine general medical examination at a health care facility- shingrix 1 given today, rtc 3 months for second one. She is up to date on mammogram, pap and colonoscopy. Continue healthy diet and regular exercise.           Migraines - controlled, follows with neuro  Orders Placed This Encounter   Procedures    Shingrix 1st Dose (Today)       Meds & Refills for this Visit:  Requested Prescriptions      No prescriptions requested or ordered in this encounter       Imaging & Consults:  ZOSTER VACC RECOMBINANT IM NJX      Return in about 3 months (around 9/28/2024), or if symptoms worsen or fail to improve, for nurse visit for second shingrix.    There are no Patient Instructions on file for this visit.    All questions were answered and the patient understands the plan.

## 2024-08-30 ENCOUNTER — TELEPHONE (OUTPATIENT)
Dept: INTERNAL MEDICINE CLINIC | Facility: CLINIC | Age: 49
End: 2024-08-30

## 2024-08-30 DIAGNOSIS — Z23 NEED FOR VACCINATION: ICD-10-CM

## 2024-09-23 ENCOUNTER — NURSE ONLY (OUTPATIENT)
Dept: INTERNAL MEDICINE CLINIC | Facility: CLINIC | Age: 49
End: 2024-09-23
Payer: COMMERCIAL

## 2024-09-23 PROCEDURE — 90750 HZV VACC RECOMBINANT IM: CPT | Performed by: INTERNAL MEDICINE

## 2024-09-23 PROCEDURE — 90471 IMMUNIZATION ADMIN: CPT | Performed by: INTERNAL MEDICINE

## 2024-10-18 ENCOUNTER — NURSE TRIAGE (OUTPATIENT)
Dept: INTERNAL MEDICINE CLINIC | Facility: CLINIC | Age: 49
End: 2024-10-18

## 2024-10-18 NOTE — TELEPHONE ENCOUNTER
Action Requested: Summary for Provider     []  Critical Lab, Recommendations Needed  [] Need Additional Advice  []   FYI    []   Need Orders  [] Need Medications Sent to Pharmacy  []  Other     SUMMARY: Received call from pt. Per pt, about 5 weeks ago so had a HIIT workout class in the morning and then was cleaning out a garage in the afternoon. The next day, she was having pain in L elbow. Over past 5 weeks, she has had elbow pain that varies in intensity. Denies discoloration/swelling. Able to move arm, but lifting weight is more difficult. Patient asking about imaging orders. Advised to follow-up in office for melanie pt agreeable. Appointment scheduled with Britany Rousseau 10/22.     Reason for call: Acute  Onset: 5 weeks        Reason for Disposition   Elbow pain is a chronic symptom (recurrent or ongoing AND lasting > 4 weeks)   MILD pain (e.g., does not interfere with normal activities) and present > 7 days    Protocols used: Elbow Pain-A-[place]     No

## 2024-10-22 ENCOUNTER — OFFICE VISIT (OUTPATIENT)
Dept: INTERNAL MEDICINE CLINIC | Facility: CLINIC | Age: 49
End: 2024-10-22
Payer: COMMERCIAL

## 2024-10-22 VITALS
RESPIRATION RATE: 18 BRPM | HEIGHT: 62.6 IN | WEIGHT: 148.19 LBS | SYSTOLIC BLOOD PRESSURE: 110 MMHG | DIASTOLIC BLOOD PRESSURE: 62 MMHG | BODY MASS INDEX: 26.59 KG/M2 | HEART RATE: 80 BPM | OXYGEN SATURATION: 97 % | TEMPERATURE: 97 F

## 2024-10-22 DIAGNOSIS — M77.12 LATERAL EPICONDYLITIS OF LEFT ELBOW: Primary | ICD-10-CM

## 2024-10-22 PROCEDURE — G2211 COMPLEX E/M VISIT ADD ON: HCPCS | Performed by: NURSE PRACTITIONER

## 2024-10-22 PROCEDURE — 99213 OFFICE O/P EST LOW 20 MIN: CPT | Performed by: NURSE PRACTITIONER

## 2024-10-22 PROCEDURE — 3078F DIAST BP <80 MM HG: CPT | Performed by: NURSE PRACTITIONER

## 2024-10-22 PROCEDURE — 3008F BODY MASS INDEX DOCD: CPT | Performed by: NURSE PRACTITIONER

## 2024-10-22 PROCEDURE — 3074F SYST BP LT 130 MM HG: CPT | Performed by: NURSE PRACTITIONER

## 2024-10-22 NOTE — PROGRESS NOTES
Maria Isabel Milian is a 49 year old female.    Chief Complaint   Patient presents with    Elbow Pain     EJ RM 11- Pt is here for elbow pain for the last 6 wks, pt states she workouts so it maybe from that        HPI:   here for c/o elbow pain for 6 weeks. She has been exercising more so feels it may be related to that.   Pain left lateral elbow.  Started after a work out followed by cleaning out the garage.   Soreness/ache.  Worse with certain movements.  Radiation to top of arm.  Taking aleve PRN.    She is taking 800mg ibuprofen every 6 hrs for the rest of this week.  She does know the pain has improved on the higher dose of NSAID  Discussed epicondylitis. Cause and treatment options.  She defers PT at this time but agrees to possibly bracing and home exercise.  Dsicussed those motions to avoid.      Patient Active Problem List   Diagnosis    Herniated nucleus pulposus    Lumbar post-laminectomy syndrome    Lumbar disc herniation with radiculopathy    Allergy to other foods    Effusion of knee joint right    Patellofemoral joint pain, right    Internal derangement of knee, right    Migraine without aura, not intractable    Tension type headache    Poor sleep    Special screening for malignant neoplasm of colon     Current Outpatient Medications   Medication Sig Dispense Refill    amitriptyline 10 MG Oral Tab TAKE 2 TABLETS(20 MG) BY MOUTH EVERY NIGHT AT BEDTIME 180 tablet 3    rizatriptan 10 MG Oral Tablet Dispersible use at onset; may repeat once after 2 hours- ONLY 2 IN 24 HOUR PERIOD MAX. This is a 30 day supply. 36 tablet 3    Norethindrone Acet-Ethinyl Est 1-20 MG-MCG Oral Tab Take 1 tablet by mouth daily.      tacrolimus 0.1 % External Ointment Apply 1 Application topically 2 (two) times daily.      tretinoin 0.025 % External Cream Apply a small amount to skin as directed 1 pea size amount to face nightly      Fexofenadine HCl (ALLEGRA OR) daily. Unsure of dose      Fluticasone Propionate 50 MCG/ACT Nasal  Suspension 1 spray by Nasal route 2 (two) times daily. 1 Bottle 3      Past Medical History:    Allergic rhinitis    Arthritis    BACK PAIN    Cervical high risk HPV (human papillomavirus) test positive    Cholelithiasis    Chronic sinusitis    Decorative tattoo    Eczema    General counseling for initiation of other contraceptive measures    Herniated lumbar disc without myelopathy    L4-L5    History of molar pregnancy, antepartum (HCC)    x 2    Internal derangement of knee, right    Internal hemorrhoids    Varicella without mention of complication    Post Varicella    Wears glasses      Social History:  Social History     Socioeconomic History    Marital status:    Tobacco Use    Smoking status: Never    Smokeless tobacco: Never   Vaping Use    Vaping status: Never Used   Substance and Sexual Activity    Alcohol use: No     Alcohol/week: 0.0 standard drinks of alcohol    Drug use: No    Sexual activity: Yes     Partners: Male     Birth control/protection: Surgical, Pill, OCP     Comment: essure   Other Topics Concern    Caffeine Concern Yes     Comment: soda  diet 2-4 cans per day    Exercise Yes     Comment: 3-4 x per week     Family History   Problem Relation Age of Onset    Hypertension Mother     Other (Other) Mother 46        ruptured brain aneurysm    Breast Cancer Paternal Aunt 49        dx age 49     Asthma Daughter         Allergies  Allergies[1]    REVIEW OF SYSTEMS:   GENERAL HEALTH: feels well otherwise    MUSCULOSKELETAL:  as above.   NEURO: denies headaches,     EXAM:   /62   Pulse 80   Temp 96.7 °F (35.9 °C) (Temporal)   Resp 18   Ht 5' 2.6\" (1.59 m)   Wt 148 lb 3.2 oz (67.2 kg)   LMP 10/06/2024 (Exact Date)   SpO2 97%   BMI 26.59 kg/m²   GENERAL: well developed, well nourished,in no apparent distress  SKIN: no rashes,no suspicious lesions  MS  reproducible left lateral elbow pain worse with AROM.  No motor weakness.   EXTREMITIES: no edema, normal strength and tone  PSYCH:  alert and oriented x 3    ASSESSMENT AND PLAN:     Encounter Diagnosis   Name Primary?    Lateral epicondylitis of left elbow  continue ibuprofen as prescribed post oral surgery.  This should improve her elbow discomfort as well.  Then can take 400mg an additional 5 days with food.  Discussed bracing.  Given exercises to do at home.   Will call with unresolved symptoms.  Consider PT.   Yes       No orders of the defined types were placed in this encounter.      Meds & Refills for this Visit:  Requested Prescriptions      No prescriptions requested or ordered in this encounter       Imaging & Consults:  None    No follow-ups on file.  There are no Patient Instructions on file for this visit.       [1]   Allergies  Allergen Reactions    Seasonal Runny nose

## 2025-01-02 ENCOUNTER — OFFICE VISIT (OUTPATIENT)
Dept: INTERNAL MEDICINE CLINIC | Facility: CLINIC | Age: 50
End: 2025-01-02
Payer: COMMERCIAL

## 2025-01-02 VITALS
DIASTOLIC BLOOD PRESSURE: 60 MMHG | SYSTOLIC BLOOD PRESSURE: 102 MMHG | OXYGEN SATURATION: 99 % | WEIGHT: 142 LBS | HEART RATE: 80 BPM | TEMPERATURE: 99 F | HEIGHT: 62 IN | BODY MASS INDEX: 26.13 KG/M2

## 2025-01-02 DIAGNOSIS — J06.9 ACUTE URI: Primary | ICD-10-CM

## 2025-01-02 DIAGNOSIS — M79.10 MYALGIA: ICD-10-CM

## 2025-01-02 DIAGNOSIS — R05.1 ACUTE COUGH: ICD-10-CM

## 2025-01-02 DIAGNOSIS — Z12.31 ENCOUNTER FOR SCREENING MAMMOGRAM FOR MALIGNANT NEOPLASM OF BREAST: ICD-10-CM

## 2025-01-02 PROCEDURE — 3078F DIAST BP <80 MM HG: CPT | Performed by: NURSE PRACTITIONER

## 2025-01-02 PROCEDURE — 99213 OFFICE O/P EST LOW 20 MIN: CPT | Performed by: NURSE PRACTITIONER

## 2025-01-02 PROCEDURE — 3008F BODY MASS INDEX DOCD: CPT | Performed by: NURSE PRACTITIONER

## 2025-01-02 PROCEDURE — G2211 COMPLEX E/M VISIT ADD ON: HCPCS | Performed by: NURSE PRACTITIONER

## 2025-01-02 PROCEDURE — 3074F SYST BP LT 130 MM HG: CPT | Performed by: NURSE PRACTITIONER

## 2025-01-02 NOTE — PROGRESS NOTES
Maria Isabel Milian is a 49 year old female.    Chief Complaint   Patient presents with    Body ache and/or chills    Nasal Congestion     Room 10, HE pt is here bc of congestion since Saturday.       HPI:   Here for eval of sinus congestion body aches and chills.  Symptom onset on Saturday with dry throat.  Progressed to worsening sore throat on Monday.  Increased congestion and body aches last 24-48 hours.  Cough worse with lying down.  Sleeping more.  Covid negative x 2  No fever.  Today she is feeling a bit better.    Discussed likely viral etiology.      Patient Active Problem List   Diagnosis    Herniated nucleus pulposus    Lumbar post-laminectomy syndrome    Lumbar disc herniation with radiculopathy    Allergy to other foods    Effusion of knee joint right    Patellofemoral joint pain, right    Internal derangement of knee, right    Migraine without aura, not intractable    Tension type headache    Poor sleep    Special screening for malignant neoplasm of colon     Current Outpatient Medications   Medication Sig Dispense Refill    amoxicillin clavulanate 875-125 MG Oral Tab Take 1 tablet by mouth 2 (two) times daily for 10 days. 20 tablet 0    amitriptyline 10 MG Oral Tab TAKE 2 TABLETS(20 MG) BY MOUTH EVERY NIGHT AT BEDTIME 180 tablet 3    rizatriptan 10 MG Oral Tablet Dispersible use at onset; may repeat once after 2 hours- ONLY 2 IN 24 HOUR PERIOD MAX. This is a 30 day supply. 36 tablet 3    Norethindrone Acet-Ethinyl Est 1-20 MG-MCG Oral Tab Take 1 tablet by mouth daily.      tacrolimus 0.1 % External Ointment Apply 1 Application topically 2 (two) times daily.      tretinoin 0.025 % External Cream Apply a small amount to skin as directed 1 pea size amount to face nightly      Fexofenadine HCl (ALLEGRA OR) daily. Unsure of dose      Fluticasone Propionate 50 MCG/ACT Nasal Suspension 1 spray by Nasal route 2 (two) times daily. 1 Bottle 3      Past Medical History:    Allergic rhinitis    Arthritis    BACK  PAIN    Cervical high risk HPV (human papillomavirus) test positive    Cholelithiasis    Chronic sinusitis    Decorative tattoo    Eczema    General counseling for initiation of other contraceptive measures    Herniated lumbar disc without myelopathy    L4-L5    History of molar pregnancy, antepartum (HCC)    x 2    Internal derangement of knee, right    Internal hemorrhoids    Varicella without mention of complication    Post Varicella    Wears glasses      Social History:  Social History     Socioeconomic History    Marital status:    Tobacco Use    Smoking status: Never    Smokeless tobacco: Never   Vaping Use    Vaping status: Never Used   Substance and Sexual Activity    Alcohol use: No     Alcohol/week: 0.0 standard drinks of alcohol    Drug use: No    Sexual activity: Yes     Partners: Male     Birth control/protection: Surgical, Pill, OCP     Comment: essure   Other Topics Concern    Caffeine Concern Yes     Comment: soda  diet 2-4 cans per day    Exercise Yes     Comment: 3-4 x per week     Family History   Problem Relation Age of Onset    Hypertension Mother     Other (Other) Mother 46        ruptured brain aneurysm    Breast Cancer Paternal Aunt 49        dx age 49     Asthma Daughter         Allergies  Allergies[1]    REVIEW OF SYSTEMS:   GENERAL HEALTH: feels ill  RESPIRATORY: denies shortness of breath with exertion, cough  CARDIOVASCULAR: denies chest pain on exertion, no palpatations  GI: denies abdominal pain and denies heartburn, no diarrhea or constipation  MS  as above   NEURO: denies headaches,     EXAM:   /60 (BP Location: Left arm, Patient Position: Sitting, Cuff Size: adult)   Pulse 80   Temp 98.5 °F (36.9 °C) (Oral)   Ht 5' 2\" (1.575 m)   Wt 142 lb (64.4 kg)   LMP 10/06/2024 (Exact Date)   SpO2 99%   BMI 25.97 kg/m²   GENERAL: well developed, well nourished,in no apparent distress  HEENT: atraumatic, normocephalic,ears and throat are clear  NECK: supple,bilateral  submandibular lymphadenopathy.  ,  LUNGS: normal rate without respiratory distress, lungs clear to auscultation  no wheezing.   CARDIO: RRR without murmur  PSYCH: alert and oriented x 3    ASSESSMENT AND PLAN:     Encounter Diagnoses   Name Primary?    Acute URI  she is feeling better today. Continue conservative measures.  If symptoms worsen or fail to improve over the next 48 hrs, augmentin bid.    Delsym for cough suppression.  Yes    Encounter for screening mammogram for malignant neoplasm of breast     Acute cough     Myalgia    Call with unresolved symptoms.        No orders of the defined types were placed in this encounter.      Meds & Refills for this Visit:  Requested Prescriptions     Signed Prescriptions Disp Refills    amoxicillin clavulanate 875-125 MG Oral Tab 20 tablet 0     Sig: Take 1 tablet by mouth 2 (two) times daily for 10 days.       Imaging & Consults:  [facility] SHOSHANA 2D+3D SCREENING BILAT (CPT=77067/28793)    No follow-ups on file.  There are no Patient Instructions on file for this visit.       [1]   Allergies  Allergen Reactions    Seasonal Runny nose

## 2025-02-10 NOTE — PROGRESS NOTES
CHIEF COMPLAINT:   Patient presents with:  Sinusitis      HPI:   Benjamín Samaniego is a 53year old female who presents for cold symptoms for  2  weeks.  Symptoms have actually gotten better but she is concerned about lingering symptoms becoming progressive wh 12/2012    ESSURE   • OTHER SURGICAL HISTORY      Esophagoscopy with endoscopic ultrasound   • PUNC/ASPIR BREAST CYST  1992    left breast cyst   • TRANSFORAMINAL EPIDURAL - LUMBAR Left 7/17/2014    Performed by Brendon Armenta DO at [address] [place] [address] with:      ASSESSMENT:  Upper respiratory tract infection, unspecified type  (primary encounter diagnosis)      PLAN: symptoms are resolving and recommend continued flonase and add tessalon and mucinex.   Provided augmentin to travel with with progressively Attending Attestation (For Attendings USE Only)...

## 2025-05-12 DIAGNOSIS — G43.001 MIGRAINE WITHOUT AURA AND WITH STATUS MIGRAINOSUS, NOT INTRACTABLE: ICD-10-CM

## 2025-05-12 NOTE — TELEPHONE ENCOUNTER
Medication: Amitriptyline 10mg     Date of last refill: 4/6/24 (#180/3)  [place]      Last office visit: 4/26/24  Due back to clinic per last office note:  1y  Date next office visit scheduled:    Future Appointments   Date Time Provider Department Center   9/24/2025 10:55 AM Babs Bland DO [facility]           Last OV note recommendation:    Patient presents for follow up for migraine headaches  Well controlled on current regime    Continue Amitriptyline 20 mg at night     Continue Rizatriptan prn     Follow up in about 12 months or sooner if needed

## 2025-05-12 NOTE — TELEPHONE ENCOUNTER
Patient needs a refill amitriptyline 10 MG Oral Tab and send to pharm. Also patient is set for another appointment. Would need a refill until next appointment.

## 2025-05-13 RX ORDER — AMITRIPTYLINE HYDROCHLORIDE 10 MG/1
TABLET ORAL
Qty: 180 TABLET | Refills: 0 | Status: SHIPPED | OUTPATIENT
Start: 2025-05-13

## 2025-05-23 ENCOUNTER — HOSPITAL ENCOUNTER (OUTPATIENT)
Dept: MAMMOGRAPHY | Age: 50
Discharge: HOME | End: 2025-05-23
Attending: NURSE PRACTITIONER
Payer: COMMERCIAL

## 2025-05-23 DIAGNOSIS — Z12.31 ENCOUNTER FOR SCREENING MAMMOGRAM FOR MALIGNANT NEOPLASM OF BREAST: ICD-10-CM

## 2025-05-23 PROCEDURE — 77067 SCR MAMMO BI INCL CAD: CPT | Performed by: NURSE PRACTITIONER

## 2025-05-23 PROCEDURE — 77063 BREAST TOMOSYNTHESIS BI: CPT | Performed by: NURSE PRACTITIONER

## 2025-06-05 DIAGNOSIS — R92.2 INCONCLUSIVE MAMMOGRAM: ICD-10-CM

## 2025-06-05 DIAGNOSIS — R92.30 DENSE BREAST: Primary | ICD-10-CM

## 2025-06-26 ENCOUNTER — HOSPITAL ENCOUNTER (OUTPATIENT)
Dept: MAMMOGRAPHY | Facility: HOSPITAL | Age: 50
Discharge: HOME | End: 2025-06-26
Attending: NURSE PRACTITIONER
Payer: COMMERCIAL

## 2025-06-26 DIAGNOSIS — R92.30 DENSE BREAST: ICD-10-CM

## 2025-06-26 DIAGNOSIS — R92.2 INCONCLUSIVE MAMMOGRAM: ICD-10-CM

## 2025-06-26 PROCEDURE — 76641 ULTRASOUND BREAST COMPLETE: CPT | Performed by: NURSE PRACTITIONER

## 2025-07-11 ENCOUNTER — LAB ENCOUNTER (OUTPATIENT)
Dept: LAB | Age: 50
End: 2025-07-11
Attending: INTERNAL MEDICINE
Payer: COMMERCIAL

## 2025-07-11 ENCOUNTER — TELEMEDICINE (OUTPATIENT)
Dept: INTERNAL MEDICINE CLINIC | Facility: CLINIC | Age: 50
End: 2025-07-11
Payer: COMMERCIAL

## 2025-07-11 DIAGNOSIS — R30.0 DYSURIA: Primary | ICD-10-CM

## 2025-07-11 DIAGNOSIS — R30.0 DYSURIA: ICD-10-CM

## 2025-07-11 DIAGNOSIS — T75.3XXA MOTION SICKNESS, INITIAL ENCOUNTER: ICD-10-CM

## 2025-07-11 LAB
BILIRUB UR QL STRIP.AUTO: NEGATIVE
COLOR UR AUTO: YELLOW
GLUCOSE UR STRIP.AUTO-MCNC: >1000 MG/DL
KETONES UR STRIP.AUTO-MCNC: NEGATIVE MG/DL
LEUKOCYTE ESTERASE UR QL STRIP.AUTO: 500
PH UR STRIP.AUTO: 6.5 [PH] (ref 5–8)
PROT UR STRIP.AUTO-MCNC: NEGATIVE MG/DL
RBC UR QL AUTO: NEGATIVE
SP GR UR STRIP.AUTO: 1.01 (ref 1–1.03)
UROBILINOGEN UR STRIP.AUTO-MCNC: NORMAL MG/DL
WBC #/AREA URNS AUTO: >50 /HPF
WBC CLUMPS UR QL AUTO: PRESENT /HPF

## 2025-07-11 PROCEDURE — 87186 SC STD MICRODIL/AGAR DIL: CPT | Performed by: INTERNAL MEDICINE

## 2025-07-11 PROCEDURE — 81001 URINALYSIS AUTO W/SCOPE: CPT | Performed by: INTERNAL MEDICINE

## 2025-07-11 PROCEDURE — 87077 CULTURE AEROBIC IDENTIFY: CPT | Performed by: INTERNAL MEDICINE

## 2025-07-11 PROCEDURE — 87086 URINE CULTURE/COLONY COUNT: CPT | Performed by: INTERNAL MEDICINE

## 2025-07-11 PROCEDURE — 98005 SYNCH AUDIO-VIDEO EST LOW 20: CPT | Performed by: INTERNAL MEDICINE

## 2025-07-11 RX ORDER — SCOPOLAMINE 1 MG/3D
1 PATCH, EXTENDED RELEASE TRANSDERMAL
Qty: 3 PATCH | Refills: 0 | Status: SHIPPED | OUTPATIENT
Start: 2025-07-11

## 2025-07-11 RX ORDER — NITROFURANTOIN 25; 75 MG/1; MG/1
100 CAPSULE ORAL 2 TIMES DAILY
Qty: 10 CAPSULE | Refills: 0 | Status: SHIPPED | OUTPATIENT
Start: 2025-07-11

## 2025-07-11 NOTE — PROGRESS NOTES
The patient's office visit is a phone and video visit with informed patient consent.   Time Spent:11 min    Subjective     HPI:   Maria Isabel Milian is a 49 year old pleasant patient c/o dysuria and frequency for 3 days. No f/c/back pain/hematuria  Feels like uti she had 4 years ago. Able to give UA today. Also going on cruise for 5 days and has h/o motion sickness. Would like patch to help with symptoms     No Further Nursing Notes to Review            REVIEW OF SYSTEMS:  No f/c/hematuria. +dysuria and frequency  Motion sickness with cruises           Physical Exam:  Pleasant, NAD  Speaking in full sentences, no labored breathing  Mood is normal        Assessment    Diagnoses and all orders for this visit:    Dysuria- likely UTI, asked pt to check UA before starting macrobid and she is agreeable. Push fluids, call if not better by next week  -     nitrofurantoin monohydrate macro 100 MG Oral Cap; Take 1 capsule (100 mg total) by mouth 2 (two) times daily.  -     UA/M With Culture Reflex [E]; Future    Motion sickness, initial encounter- scopolamine patch as directed  -     Scopolamine 1.5mg TD patch 1mg/3days; Place 1 patch onto the skin every third day.       Continue with current treatment plan.  Reinforced healthy diet, lifestyle, and exercise.    No follow-ups on file.    Farzaneh Aviles MD    Maria Isabel Milian understands phone evaluation is not a substitute for face-to-face examination or emergency care. Patient advised to go to ER or call 911 for worsening symptoms or acute distress.     Please note that the following visit was completed using two-way, real-time interactive audio and video communication.  This has been done in good kayla to provide continuity of care in the best interest of the provider-patient relationship  There are limitations of this visit as no physical exam could be performed.  Every conscious effort was taken to allow for sufficient and adequate time.  This billing visit was spent on audio  and video visit, reviewing labs, medications, radiology tests and decision making.  Appropriate medical decision-making and tests are ordered as detailed in the plan of care above.

## 2025-07-12 ENCOUNTER — RESULTS FOLLOW-UP (OUTPATIENT)
Dept: INTERNAL MEDICINE CLINIC | Facility: CLINIC | Age: 50
End: 2025-07-12

## 2025-07-16 NOTE — TELEPHONE ENCOUNTER
Farzaneh Aviles MD  7/14/2025 11:51 PM CDT Back to Top      Klebsiella sensitive to nitrofurantoin  Patient to finish the course and come in if symptoms persist after finishing nitrofurantoin     Per result note.

## 2025-08-14 ENCOUNTER — TELEPHONE (OUTPATIENT)
Dept: INTERNAL MEDICINE CLINIC | Facility: CLINIC | Age: 50
End: 2025-08-14

## 2025-08-14 ENCOUNTER — LAB ENCOUNTER (OUTPATIENT)
Dept: LAB | Age: 50
End: 2025-08-14
Attending: INTERNAL MEDICINE

## 2025-08-14 DIAGNOSIS — Z13.29 SCREENING FOR THYROID DISORDER: ICD-10-CM

## 2025-08-14 DIAGNOSIS — Z13.0 SCREENING FOR BLOOD DISEASE: ICD-10-CM

## 2025-08-14 DIAGNOSIS — Z13.228 SCREENING FOR ENDOCRINE, METABOLIC AND IMMUNITY DISORDER: ICD-10-CM

## 2025-08-14 DIAGNOSIS — Z13.29 SCREENING FOR ENDOCRINE, METABOLIC AND IMMUNITY DISORDER: ICD-10-CM

## 2025-08-14 DIAGNOSIS — Z13.220 SCREENING FOR LIPOID DISORDERS: ICD-10-CM

## 2025-08-14 DIAGNOSIS — Z13.0 SCREENING FOR ENDOCRINE, METABOLIC AND IMMUNITY DISORDER: ICD-10-CM

## 2025-08-14 DIAGNOSIS — Z00.00 ROUTINE GENERAL MEDICAL EXAMINATION AT A HEALTH CARE FACILITY: Primary | ICD-10-CM

## 2025-08-14 DIAGNOSIS — Z00.00 ROUTINE GENERAL MEDICAL EXAMINATION AT A HEALTH CARE FACILITY: ICD-10-CM

## 2025-08-14 LAB
ALBUMIN SERPL-MCNC: 4.8 G/DL (ref 3.2–4.8)
ALBUMIN/GLOB SERPL: 1.4 (ref 1–2)
ALP LIVER SERPL-CCNC: 95 U/L (ref 41–108)
ALT SERPL-CCNC: 21 U/L (ref 10–49)
ANION GAP SERPL CALC-SCNC: 11 MMOL/L (ref 0–18)
AST SERPL-CCNC: 26 U/L (ref ?–34)
BASOPHILS # BLD AUTO: 0.07 X10(3) UL (ref 0–0.2)
BASOPHILS NFR BLD AUTO: 0.9 %
BILIRUB SERPL-MCNC: 0.4 MG/DL (ref 0.3–1.2)
BUN BLD-MCNC: 13 MG/DL (ref 9–23)
CALCIUM BLD-MCNC: 10.3 MG/DL (ref 8.7–10.6)
CHLORIDE SERPL-SCNC: 102 MMOL/L (ref 98–112)
CHOLEST SERPL-MCNC: 185 MG/DL (ref ?–200)
CO2 SERPL-SCNC: 27 MMOL/L (ref 21–32)
CREAT BLD-MCNC: 0.94 MG/DL (ref 0.55–1.02)
EGFRCR SERPLBLD CKD-EPI 2021: 73 ML/MIN/1.73M2 (ref 60–?)
EOSINOPHIL # BLD AUTO: 0.19 X10(3) UL (ref 0–0.7)
EOSINOPHIL NFR BLD AUTO: 2.4 %
ERYTHROCYTE [DISTWIDTH] IN BLOOD BY AUTOMATED COUNT: 14.7 %
FASTING PATIENT LIPID ANSWER: YES
FASTING STATUS PATIENT QL REPORTED: YES
GLOBULIN PLAS-MCNC: 3.4 G/DL (ref 2–3.5)
GLUCOSE BLD-MCNC: 87 MG/DL (ref 70–99)
HCT VFR BLD AUTO: 37.2 % (ref 35–48)
HDLC SERPL-MCNC: 61 MG/DL (ref 40–59)
HGB BLD-MCNC: 11.4 G/DL (ref 12–16)
IMM GRANULOCYTES # BLD AUTO: 0.03 X10(3) UL (ref 0–1)
IMM GRANULOCYTES NFR BLD: 0.4 %
LDLC SERPL CALC-MCNC: 97 MG/DL (ref ?–100)
LYMPHOCYTES # BLD AUTO: 2 X10(3) UL (ref 1–4)
LYMPHOCYTES NFR BLD AUTO: 25.6 %
MCH RBC QN AUTO: 24.9 PG (ref 26–34)
MCHC RBC AUTO-ENTMCNC: 30.6 G/DL (ref 31–37)
MCV RBC AUTO: 81.2 FL (ref 80–100)
MONOCYTES # BLD AUTO: 0.68 X10(3) UL (ref 0.1–1)
MONOCYTES NFR BLD AUTO: 8.7 %
NEUTROPHILS # BLD AUTO: 4.83 X10 (3) UL (ref 1.5–7.7)
NEUTROPHILS # BLD AUTO: 4.83 X10(3) UL (ref 1.5–7.7)
NEUTROPHILS NFR BLD AUTO: 62 %
NONHDLC SERPL-MCNC: 124 MG/DL (ref ?–130)
OSMOLALITY SERPL CALC.SUM OF ELEC: 289 MOSM/KG (ref 275–295)
PLATELET # BLD AUTO: 370 10(3)UL (ref 150–450)
POTASSIUM SERPL-SCNC: 4.4 MMOL/L (ref 3.5–5.1)
PROT SERPL-MCNC: 8.2 G/DL (ref 5.7–8.2)
RBC # BLD AUTO: 4.58 X10(6)UL (ref 3.8–5.3)
SODIUM SERPL-SCNC: 140 MMOL/L (ref 136–145)
TRIGL SERPL-MCNC: 158 MG/DL (ref 30–149)
TSI SER-ACNC: 1.55 UIU/ML (ref 0.55–4.78)
VLDLC SERPL CALC-MCNC: 26 MG/DL (ref 0–30)
WBC # BLD AUTO: 7.8 X10(3) UL (ref 4–11)

## 2025-08-14 PROCEDURE — 80050 GENERAL HEALTH PANEL: CPT | Performed by: INTERNAL MEDICINE

## 2025-08-14 PROCEDURE — 80061 LIPID PANEL: CPT | Performed by: INTERNAL MEDICINE

## 2025-08-15 ENCOUNTER — OFFICE VISIT (OUTPATIENT)
Dept: INTERNAL MEDICINE CLINIC | Facility: CLINIC | Age: 50
End: 2025-08-15

## 2025-08-15 VITALS
WEIGHT: 123.38 LBS | HEIGHT: 62.6 IN | HEART RATE: 72 BPM | TEMPERATURE: 98 F | OXYGEN SATURATION: 98 % | DIASTOLIC BLOOD PRESSURE: 60 MMHG | RESPIRATION RATE: 16 BRPM | SYSTOLIC BLOOD PRESSURE: 108 MMHG | BODY MASS INDEX: 22.14 KG/M2

## 2025-08-15 DIAGNOSIS — R30.0 DYSURIA: ICD-10-CM

## 2025-08-15 DIAGNOSIS — H02.9 LESION OF EYELID: ICD-10-CM

## 2025-08-15 DIAGNOSIS — Z00.00 ROUTINE GENERAL MEDICAL EXAMINATION AT A HEALTH CARE FACILITY: Primary | ICD-10-CM

## 2025-08-15 DIAGNOSIS — N92.0 MENORRHAGIA WITH REGULAR CYCLE: ICD-10-CM

## 2025-08-15 DIAGNOSIS — D64.9 NORMOCYTIC ANEMIA: ICD-10-CM

## 2025-08-15 LAB
BILIRUBIN: NEGATIVE
GLUCOSE (URINE DIPSTICK): NEGATIVE MG/DL
KETONES (URINE DIPSTICK): NEGATIVE MG/DL
LEUKOCYTES: NEGATIVE
MULTISTIX LOT#: ABNORMAL NUMERIC
NITRITE, URINE: NEGATIVE
PH, URINE: 6.5 (ref 4.5–8)
PROTEIN (URINE DIPSTICK): NEGATIVE MG/DL
SPECIFIC GRAVITY: 1.01 (ref 1–1.03)
URINE-COLOR: YELLOW
UROBILINOGEN,SEMI-QN: 0.2 MG/DL (ref 0–1.9)

## 2025-08-15 PROCEDURE — 87086 URINE CULTURE/COLONY COUNT: CPT | Performed by: INTERNAL MEDICINE

## 2025-08-15 RX ORDER — NITROFURANTOIN 25; 75 MG/1; MG/1
100 CAPSULE ORAL 2 TIMES DAILY
Qty: 10 CAPSULE | Refills: 0 | Status: SHIPPED | OUTPATIENT
Start: 2025-08-15

## (undated) NOTE — MR AVS SNAPSHOT
[facility]  [facility], [address]  [address] [id number] [phone]               Thank you for choosing us for your health care visit with Pinky Enriquez PA-C.   We are glad to serve you and happy to provide you with this maddox Follow these tips when taking care of yourself at home:  · Don’t drive yourself home if you were given pain medicine for your headache or are having visual symptoms. Instead, have someone else drive you home. Try to sleep when you get home.  You should feel · Put an ice pack on your forehead or on the area of greatest pain. A heating pad and massage may help if you are having a muscle spasm and tightness in your neck.   · If you have been prescribed a medicine to stop a migraine headache, use this at the first Current Medications          This list is accurate as of: 5/25/17  5:41 PM.  Always use your most recent med list.                MULTIVITAMIN OR   Take by mouth.            Nitrofurantoin Monohyd Macro 100 MG Caps   TAKE ONE CAPSULE BY MOUTH TWICE A DAY

## (undated) NOTE — MR AVS SNAPSHOT
[facility]  [address] [facility] [address]  [phone]               Thank you for choosing us for your health care visit with Connor Espino PA-C.   We are glad to serve you and happy to provide you with this summary of your · An expectorant containing guaifenesin may help thin the mucus and promote drainage from the sinuses. · Over-the-counter decongestants may be used unless a similar medicine was prescribed.  Nasal sprays work the fastest. Use one that contains phenylephrin © 0532-2815 The [address] [place] [address], [address] [place]. All rights reserved. This information is not intended as a substitute for professional medical care. Always follow your healthcare professional's instructions.         Self-Ca © 3131-2234 The [address] [place] [address], [address] [place]. All rights reserved. This information is not intended as a substitute for professional medical care. Always follow your healthcare professional's instructions.         Prevent sinuses to drain better. And this helps prevent infection. Ask your doctor about these suggestions:  · Use a humidifier. Clean it often to remove any mold or mildew. · Drink several glasses of water a day.   · Stay away from drying beverages such as alcoho Phone:  [phone]    - Amoxicillin-Pot Clavulanate 875-125 MG Tabs            MyChart     Visit MyChart  You can access your MyChart to more actively manage your health care and view more details from this visit by going to [email or web address] [facility]. or

## (undated) NOTE — LETTER
[address]  [address], [address] [address] [phone]     Patient: Paco Howard   YOB: 1975   Date of Visit: 2/9/2021     Dear Employer,        February 9, 2021    At [facility], we are taking sp Persons infected with SARS-CoV-2 who never develop COVID-19 symptoms may discontinue isolation and other precautions 10 days after the date of their first positive RT-PCR test for SARS-CoV-2 RNA.     Persons who are asymptomatic but have been exposed, CDC r